# Patient Record
Sex: MALE | Race: WHITE | NOT HISPANIC OR LATINO
[De-identification: names, ages, dates, MRNs, and addresses within clinical notes are randomized per-mention and may not be internally consistent; named-entity substitution may affect disease eponyms.]

---

## 2018-03-27 ENCOUNTER — APPOINTMENT (OUTPATIENT)
Dept: FAMILY MEDICINE | Facility: CLINIC | Age: 53
End: 2018-03-27
Payer: COMMERCIAL

## 2018-03-27 VITALS
HEART RATE: 96 BPM | OXYGEN SATURATION: 78 % | BODY MASS INDEX: 30.06 KG/M2 | WEIGHT: 210 LBS | SYSTOLIC BLOOD PRESSURE: 148 MMHG | HEIGHT: 70 IN | DIASTOLIC BLOOD PRESSURE: 100 MMHG

## 2018-03-27 PROCEDURE — 93000 ELECTROCARDIOGRAM COMPLETE: CPT

## 2018-03-27 PROCEDURE — 99386 PREV VISIT NEW AGE 40-64: CPT | Mod: 25

## 2018-03-27 PROCEDURE — 36415 COLL VENOUS BLD VENIPUNCTURE: CPT

## 2018-03-28 LAB
ALBUMIN SERPL ELPH-MCNC: 4.4 G/DL
ALP BLD-CCNC: 76 U/L
ALT SERPL-CCNC: 17 U/L
ANION GAP SERPL CALC-SCNC: 16 MMOL/L
AST SERPL-CCNC: 19 U/L
BASOPHILS # BLD AUTO: 0.04 K/UL
BASOPHILS NFR BLD AUTO: 0.4 %
BILIRUB SERPL-MCNC: 0.2 MG/DL
BUN SERPL-MCNC: 23 MG/DL
CALCIUM SERPL-MCNC: 9.8 MG/DL
CHLORIDE SERPL-SCNC: 104 MMOL/L
CHOLEST SERPL-MCNC: 199 MG/DL
CHOLEST/HDLC SERPL: 5 RATIO
CO2 SERPL-SCNC: 21 MMOL/L
CREAT SERPL-MCNC: 1.16 MG/DL
EOSINOPHIL # BLD AUTO: 0.22 K/UL
EOSINOPHIL NFR BLD AUTO: 2.4 %
GLUCOSE SERPL-MCNC: 86 MG/DL
HBA1C MFR BLD HPLC: 5.8 %
HCT VFR BLD CALC: 44.9 %
HDLC SERPL-MCNC: 40 MG/DL
HGB BLD-MCNC: 15.7 G/DL
IMM GRANULOCYTES NFR BLD AUTO: 0.2 %
LDLC SERPL CALC-MCNC: 117 MG/DL
LYMPHOCYTES # BLD AUTO: 2.49 K/UL
LYMPHOCYTES NFR BLD AUTO: 27.2 %
MAN DIFF?: NORMAL
MCHC RBC-ENTMCNC: 31.9 PG
MCHC RBC-ENTMCNC: 35 GM/DL
MCV RBC AUTO: 91.3 FL
MONOCYTES # BLD AUTO: 0.93 K/UL
MONOCYTES NFR BLD AUTO: 10.2 %
NEUTROPHILS # BLD AUTO: 5.44 K/UL
NEUTROPHILS NFR BLD AUTO: 59.6 %
PLATELET # BLD AUTO: 240 K/UL
POTASSIUM SERPL-SCNC: 4.4 MMOL/L
PROT SERPL-MCNC: 7.4 G/DL
PSA SERPL-MCNC: 1.12 NG/ML
RBC # BLD: 4.92 M/UL
RBC # FLD: 14.1 %
SODIUM SERPL-SCNC: 141 MMOL/L
T4 FREE SERPL-MCNC: 1.2 NG/DL
TESTOST SERPL-MCNC: 399.1 NG/DL
TRIGL SERPL-MCNC: 210 MG/DL
TSH SERPL-ACNC: 2.2 UIU/ML
WBC # FLD AUTO: 9.14 K/UL

## 2018-04-11 ENCOUNTER — APPOINTMENT (OUTPATIENT)
Dept: FAMILY MEDICINE | Facility: CLINIC | Age: 53
End: 2018-04-11

## 2018-05-07 ENCOUNTER — APPOINTMENT (OUTPATIENT)
Dept: FAMILY MEDICINE | Facility: CLINIC | Age: 53
End: 2018-05-07
Payer: COMMERCIAL

## 2018-05-07 VITALS
WEIGHT: 210 LBS | SYSTOLIC BLOOD PRESSURE: 140 MMHG | HEART RATE: 90 BPM | OXYGEN SATURATION: 98 % | BODY MASS INDEX: 30.06 KG/M2 | DIASTOLIC BLOOD PRESSURE: 90 MMHG | HEIGHT: 70 IN

## 2018-05-07 PROCEDURE — 99214 OFFICE O/P EST MOD 30 MIN: CPT

## 2018-05-07 RX ORDER — LOSARTAN POTASSIUM 25 MG/1
25 TABLET, FILM COATED ORAL
Refills: 0 | Status: DISCONTINUED | COMMUNITY
End: 2018-05-07

## 2018-06-04 ENCOUNTER — RX RENEWAL (OUTPATIENT)
Age: 53
End: 2018-06-04

## 2018-07-09 ENCOUNTER — RX RENEWAL (OUTPATIENT)
Age: 53
End: 2018-07-09

## 2018-07-26 ENCOUNTER — APPOINTMENT (OUTPATIENT)
Dept: FAMILY MEDICINE | Facility: CLINIC | Age: 53
End: 2018-07-26
Payer: COMMERCIAL

## 2018-07-26 VITALS
TEMPERATURE: 98.1 F | BODY MASS INDEX: 29.78 KG/M2 | HEART RATE: 99 BPM | HEIGHT: 70 IN | DIASTOLIC BLOOD PRESSURE: 100 MMHG | WEIGHT: 208 LBS | OXYGEN SATURATION: 96 % | SYSTOLIC BLOOD PRESSURE: 140 MMHG

## 2018-07-26 VITALS — SYSTOLIC BLOOD PRESSURE: 140 MMHG | RESPIRATION RATE: 16 BRPM | DIASTOLIC BLOOD PRESSURE: 90 MMHG

## 2018-07-26 PROCEDURE — 99213 OFFICE O/P EST LOW 20 MIN: CPT

## 2018-07-26 NOTE — PHYSICAL EXAM
[No Acute Distress] : no acute distress [Well Nourished] : well nourished [Normal Sclera/Conjunctiva] : normal sclera/conjunctiva [EOMI] : extraocular movements intact [No Respiratory Distress] : no respiratory distress  [No Accessory Muscle Use] : no accessory muscle use [Normal Gait] : normal gait [Normal Affect] : the affect was normal [Alert and Oriented x3] : oriented to person, place, and time [Normal Insight/Judgement] : insight and judgment were intact [de-identified] : unable to bring up rt arm, reduced ROM, + rt shoulder tenderness

## 2018-07-26 NOTE — HISTORY OF PRESENT ILLNESS
[FreeTextEntry8] : c/o unable to  rt arm x 1 wk, believes he strained it at work\par on meloxicam\par hx of rt shoulder issue many yrs ago, had injection, improved\par would prefer pills over shot

## 2018-08-09 ENCOUNTER — APPOINTMENT (OUTPATIENT)
Dept: FAMILY MEDICINE | Facility: CLINIC | Age: 53
End: 2018-08-09
Payer: COMMERCIAL

## 2018-08-09 VITALS
HEART RATE: 83 BPM | WEIGHT: 206 LBS | HEIGHT: 70 IN | TEMPERATURE: 98.7 F | SYSTOLIC BLOOD PRESSURE: 140 MMHG | OXYGEN SATURATION: 96 % | BODY MASS INDEX: 29.49 KG/M2 | DIASTOLIC BLOOD PRESSURE: 100 MMHG

## 2018-08-09 DIAGNOSIS — R33.9 RETENTION OF URINE, UNSPECIFIED: ICD-10-CM

## 2018-08-09 PROCEDURE — 36415 COLL VENOUS BLD VENIPUNCTURE: CPT

## 2018-08-09 PROCEDURE — 99215 OFFICE O/P EST HI 40 MIN: CPT | Mod: 25

## 2018-08-09 RX ORDER — PREDNISONE 20 MG/1
20 TABLET ORAL
Qty: 12 | Refills: 0 | Status: DISCONTINUED | COMMUNITY
Start: 2018-07-26 | End: 2018-08-09

## 2018-08-09 RX ORDER — LOSARTAN POTASSIUM 100 MG/1
100 TABLET, FILM COATED ORAL
Qty: 90 | Refills: 1 | Status: DISCONTINUED | COMMUNITY
Start: 2018-03-27 | End: 2018-08-09

## 2018-08-09 NOTE — PHYSICAL EXAM
[No Acute Distress] : no acute distress [Well Nourished] : well nourished [Normal Sclera/Conjunctiva] : normal sclera/conjunctiva [EOMI] : extraocular movements intact [Normal Outer Ear/Nose] : the outer ears and nose were normal in appearance [No JVD] : no jugular venous distention [No Respiratory Distress] : no respiratory distress  [Clear to Auscultation] : lungs were clear to auscultation bilaterally [No Accessory Muscle Use] : no accessory muscle use [Normal Rate] : normal rate  [Regular Rhythm] : with a regular rhythm [Normal S1, S2] : normal S1 and S2 [No Edema] : there was no peripheral edema [Normal Gait] : normal gait [Normal Affect] : the affect was normal [Alert and Oriented x3] : oriented to person, place, and time [Normal Insight/Judgement] : insight and judgment were intact [de-identified] : lateral rt ankle swelling, mildly tender, increased rom to rt shoulder

## 2018-08-09 NOTE — REVIEW OF SYSTEMS
[Joint Pain] : joint pain [Negative] : Respiratory [FreeTextEntry9] : improved rt shoulder, swelling and pain to rt ankle

## 2018-08-09 NOTE — COUNSELING
[Healthy eating counseling provided] : healthy eating [Activity counseling provided] : activity [Low Salt Diet] : Low salt diet

## 2018-08-09 NOTE — HISTORY OF PRESENT ILLNESS
[FreeTextEntry1] : c/o rt ankle sprain after falling out window during work\par states rt arm/shoulder feels much better\par does occasionally c/o he feels he doesn't complete urination\par  [de-identified] : here for repeat bw and med refills, states if he stops testosterone, he feels very tired\par trying to reduce salt to control bp, occasionally feels "swollen, bloated", would consider water pills\par

## 2018-08-10 LAB
ALBUMIN SERPL ELPH-MCNC: 4.2 G/DL
ALP BLD-CCNC: 61 U/L
ALT SERPL-CCNC: 13 U/L
ANION GAP SERPL CALC-SCNC: 16 MMOL/L
AST SERPL-CCNC: 15 U/L
BASOPHILS # BLD AUTO: 0.03 K/UL
BASOPHILS NFR BLD AUTO: 0.3 %
BILIRUB SERPL-MCNC: 0.5 MG/DL
BUN SERPL-MCNC: 22 MG/DL
CALCIUM SERPL-MCNC: 9.3 MG/DL
CHLORIDE SERPL-SCNC: 104 MMOL/L
CO2 SERPL-SCNC: 22 MMOL/L
CREAT SERPL-MCNC: 1.22 MG/DL
EOSINOPHIL # BLD AUTO: 0.19 K/UL
EOSINOPHIL NFR BLD AUTO: 2.1 %
GLUCOSE SERPL-MCNC: 101 MG/DL
HBA1C MFR BLD HPLC: 5.8 %
HCT VFR BLD CALC: 48.5 %
HGB BLD-MCNC: 15.9 G/DL
IMM GRANULOCYTES NFR BLD AUTO: 0.3 %
LYMPHOCYTES # BLD AUTO: 1.81 K/UL
LYMPHOCYTES NFR BLD AUTO: 20.4 %
MAN DIFF?: NORMAL
MCHC RBC-ENTMCNC: 31.5 PG
MCHC RBC-ENTMCNC: 32.8 GM/DL
MCV RBC AUTO: 96.2 FL
MONOCYTES # BLD AUTO: 0.65 K/UL
MONOCYTES NFR BLD AUTO: 7.3 %
NEUTROPHILS # BLD AUTO: 6.15 K/UL
NEUTROPHILS NFR BLD AUTO: 69.6 %
PLATELET # BLD AUTO: 254 K/UL
POTASSIUM SERPL-SCNC: 4.6 MMOL/L
PROT SERPL-MCNC: 7.2 G/DL
RBC # BLD: 5.04 M/UL
RBC # FLD: 14.2 %
SODIUM SERPL-SCNC: 142 MMOL/L
WBC # FLD AUTO: 8.86 K/UL

## 2018-08-13 LAB
CHOLEST SERPL-MCNC: 180 MG/DL
CHOLEST/HDLC SERPL: 4.2 RATIO
HDLC SERPL-MCNC: 43 MG/DL
LDLC SERPL CALC-MCNC: 101 MG/DL
TESTOST SERPL-MCNC: 863.4 NG/DL
TRIGL SERPL-MCNC: 182 MG/DL

## 2018-09-07 ENCOUNTER — RX RENEWAL (OUTPATIENT)
Age: 53
End: 2018-09-07

## 2018-10-07 ENCOUNTER — RX RENEWAL (OUTPATIENT)
Age: 53
End: 2018-10-07

## 2018-10-31 ENCOUNTER — MEDICATION RENEWAL (OUTPATIENT)
Age: 53
End: 2018-10-31

## 2018-11-12 ENCOUNTER — APPOINTMENT (OUTPATIENT)
Dept: FAMILY MEDICINE | Facility: CLINIC | Age: 53
End: 2018-11-12
Payer: COMMERCIAL

## 2018-11-12 VITALS — SYSTOLIC BLOOD PRESSURE: 130 MMHG | DIASTOLIC BLOOD PRESSURE: 90 MMHG

## 2018-11-12 LAB
BASOPHILS # BLD AUTO: 0.05 K/UL
BASOPHILS NFR BLD AUTO: 0.7 %
EOSINOPHIL # BLD AUTO: 0.18 K/UL
EOSINOPHIL NFR BLD AUTO: 2.6 %
HCT VFR BLD CALC: 45.6 %
HGB BLD-MCNC: 15.8 G/DL
IMM GRANULOCYTES NFR BLD AUTO: 0.3 %
LYMPHOCYTES # BLD AUTO: 2.05 K/UL
LYMPHOCYTES NFR BLD AUTO: 29.8 %
MAN DIFF?: NORMAL
MCHC RBC-ENTMCNC: 31.5 PG
MCHC RBC-ENTMCNC: 34.6 GM/DL
MCV RBC AUTO: 90.8 FL
MONOCYTES # BLD AUTO: 0.6 K/UL
MONOCYTES NFR BLD AUTO: 8.7 %
NEUTROPHILS # BLD AUTO: 3.99 K/UL
NEUTROPHILS NFR BLD AUTO: 57.9 %
PLATELET # BLD AUTO: 223 K/UL
PSA SERPL-MCNC: 1.22 NG/ML
RBC # BLD: 5.02 M/UL
RBC # FLD: 13.1 %
TESTOST SERPL-MCNC: 415.1 NG/DL
WBC # FLD AUTO: 6.89 K/UL

## 2018-11-12 PROCEDURE — 90686 IIV4 VACC NO PRSV 0.5 ML IM: CPT

## 2018-11-12 PROCEDURE — G0008: CPT

## 2018-11-12 PROCEDURE — 99214 OFFICE O/P EST MOD 30 MIN: CPT | Mod: 25

## 2018-11-12 PROCEDURE — 36415 COLL VENOUS BLD VENIPUNCTURE: CPT

## 2018-11-12 NOTE — ASSESSMENT
[FreeTextEntry1] : The patient has a diagnosis of hypertension.   The diagnosis was discussed with patient and need for medication compliance and possible side affects and risks of noncompliance. Patient was told to adhere to a low salt diet and try to incorporate exercise daily.\par continue on losartan 100 and reassess\par \par Patient has a diagnosis of low testosterone documented on lab work and is symptomatic.  The diagnosis and all concerns were reviewed, addressed and discussed at length. Risks versus benefits of treatment were identified and explained. Risk of testosterone therapy include increased risk of  prostate cancer, stroke, heart attack and death. Patient was advised that he would need to monitor his blood levels regularly, attend regular doctor visits and use his medications as directed.   The medication options were discussed and application instructions were given. All questions by patient were answered at time of visit. Patients labs were draw and will be followed and assessed.\par counseled patient on need for compliance and fu\par \par knee pain - chronic oa- much better since change of job\par

## 2018-11-12 NOTE — HEALTH RISK ASSESSMENT
[] : No [No falls in past year] : Patient reported no falls in the past year [0] : 2) Feeling down, depressed, or hopeless: Not at all (0) [[id number]] : 0 [Good] : ~his/her~  mood as  good [Patient refused colonoscopy] : Patient refused colonoscopy [With Significant Other] : lives with significant other [Employed] : employed [] :  [# Of Children ___] : has [unfilled] children [Feels Safe at Home] : Feels safe at home [Fully functional (bathing, dressing, toileting, transferring, walking, feeding)] : Fully functional (bathing, dressing, toileting, transferring, walking, feeding) [Fully functional (using the telephone, shopping, preparing meals, housekeeping, doing laundry, using] : Fully functional and needs no help or supervision to perform IADLs (using the telephone, shopping, preparing meals, housekeeping, doing laundry, using transportation, managing medications and managing finances) [Discussed at today's visit] : Advance Directives Discussed at today's visit [FreeTextEntry4] : none

## 2018-11-12 NOTE — HISTORY OF PRESENT ILLNESS
[de-identified] : 52 year old male is here for a followup visit for blood pressure and testosterone. Patient also with bilateral knee pain - has history of oa of both knees. Medications and allergies were reviewed and assessed.  There has been no new medications since the last visit. Patient is feeling well with no active changes or issues since His last visit.\par

## 2018-11-12 NOTE — PHYSICAL EXAM
[Well Nourished] : well nourished [Clear to Auscultation] : lungs were clear to auscultation bilaterally [No Abdominal Bruit] : a ~M bruit was not heard ~T in the abdomen [Non Tender] : non-tender [No CVA Tenderness] : no CVA  tenderness [No Joint Swelling] : no joint swelling [No Rash] : no rash [Normal Gait] : normal gait [Normal Affect] : the affect was normal

## 2018-11-13 LAB
ALBUMIN SERPL ELPH-MCNC: 4.4 G/DL
ALP BLD-CCNC: 58 U/L
ALT SERPL-CCNC: 16 U/L
ANION GAP SERPL CALC-SCNC: 15 MMOL/L
AST SERPL-CCNC: 13 U/L
BILIRUB SERPL-MCNC: 0.4 MG/DL
BUN SERPL-MCNC: 21 MG/DL
CALCIUM SERPL-MCNC: 9.6 MG/DL
CHLORIDE SERPL-SCNC: 100 MMOL/L
CO2 SERPL-SCNC: 23 MMOL/L
CREAT SERPL-MCNC: 1.07 MG/DL
GLUCOSE SERPL-MCNC: 99 MG/DL
POTASSIUM SERPL-SCNC: 4.2 MMOL/L
PROT SERPL-MCNC: 7.1 G/DL
SODIUM SERPL-SCNC: 138 MMOL/L

## 2018-11-19 ENCOUNTER — MEDICATION RENEWAL (OUTPATIENT)
Age: 53
End: 2018-11-19

## 2019-01-03 ENCOUNTER — MEDICATION RENEWAL (OUTPATIENT)
Age: 54
End: 2019-01-03

## 2019-02-04 ENCOUNTER — MEDICATION RENEWAL (OUTPATIENT)
Age: 54
End: 2019-02-04

## 2019-02-04 ENCOUNTER — RX RENEWAL (OUTPATIENT)
Age: 54
End: 2019-02-04

## 2019-02-15 ENCOUNTER — APPOINTMENT (OUTPATIENT)
Dept: FAMILY MEDICINE | Facility: CLINIC | Age: 54
End: 2019-02-15
Payer: COMMERCIAL

## 2019-02-15 VITALS — DIASTOLIC BLOOD PRESSURE: 90 MMHG | SYSTOLIC BLOOD PRESSURE: 130 MMHG

## 2019-02-15 DIAGNOSIS — M25.569 PAIN IN UNSPECIFIED KNEE: ICD-10-CM

## 2019-02-15 PROCEDURE — 36415 COLL VENOUS BLD VENIPUNCTURE: CPT

## 2019-02-15 PROCEDURE — 99214 OFFICE O/P EST MOD 30 MIN: CPT | Mod: 25

## 2019-02-15 NOTE — HEALTH RISK ASSESSMENT
[] : No [No falls in past year] : Patient reported no falls in the past year [0] : 2) Feeling down, depressed, or hopeless: Not at all (0) [[id number]] : 0 [Good] : ~his/her~  mood as  good [Patient refused colonoscopy] : Patient refused colonoscopy [With Significant Other] : lives with significant other [Employed] : employed [] :  [# Of Children ___] : has [unfilled] children [Feels Safe at Home] : Feels safe at home [Fully functional (bathing, dressing, toileting, transferring, walking, feeding)] : Fully functional (bathing, dressing, toileting, transferring, walking, feeding) [Fully functional (using the telephone, shopping, preparing meals, housekeeping, doing laundry, using] : Fully functional and needs no help or supervision to perform IADLs (using the telephone, shopping, preparing meals, housekeeping, doing laundry, using transportation, managing medications and managing finances) [Discussed at today's visit] : Advance Directives Discussed at today's visit [FreeTextEntry4] : none

## 2019-02-15 NOTE — REVIEW OF SYSTEMS
[Impotence] : impotence [Joint Pain] : joint pain [Negative] : Heme/Lymph [FreeTextEntry9] : bilateral knee

## 2019-02-15 NOTE — HISTORY OF PRESENT ILLNESS
[de-identified] : 52 year old male is here for a followup visit for blood pressure and testosterone. Patient also with bilateral knee pain - has history of oa of both knees. Medications and allergies were reviewed and assessed.  There has been no new medications since the last visit.\par \par Patient with issues with impotence

## 2019-02-16 LAB
ALBUMIN SERPL ELPH-MCNC: 4.5 G/DL
ALP BLD-CCNC: 55 U/L
ALT SERPL-CCNC: 15 U/L
ANION GAP SERPL CALC-SCNC: 12 MMOL/L
AST SERPL-CCNC: 16 U/L
BASOPHILS # BLD AUTO: 0.03 K/UL
BASOPHILS NFR BLD AUTO: 0.3 %
BILIRUB SERPL-MCNC: 0.6 MG/DL
BUN SERPL-MCNC: 27 MG/DL
CALCIUM SERPL-MCNC: 9.4 MG/DL
CHLORIDE SERPL-SCNC: 103 MMOL/L
CHOLEST SERPL-MCNC: 175 MG/DL
CHOLEST/HDLC SERPL: 4.4 RATIO
CO2 SERPL-SCNC: 24 MMOL/L
CREAT SERPL-MCNC: 1.24 MG/DL
EOSINOPHIL # BLD AUTO: 0.18 K/UL
EOSINOPHIL NFR BLD AUTO: 2 %
GLUCOSE SERPL-MCNC: 83 MG/DL
HBA1C MFR BLD HPLC: 5.4 %
HCT VFR BLD CALC: 48.4 %
HDLC SERPL-MCNC: 40 MG/DL
HGB BLD-MCNC: 16 G/DL
IMM GRANULOCYTES NFR BLD AUTO: 0.2 %
LDLC SERPL CALC-MCNC: 86 MG/DL
LYMPHOCYTES # BLD AUTO: 1.74 K/UL
LYMPHOCYTES NFR BLD AUTO: 18.9 %
MAN DIFF?: NORMAL
MCHC RBC-ENTMCNC: 31.7 PG
MCHC RBC-ENTMCNC: 33.1 GM/DL
MCV RBC AUTO: 95.8 FL
MONOCYTES # BLD AUTO: 0.64 K/UL
MONOCYTES NFR BLD AUTO: 6.9 %
NEUTROPHILS # BLD AUTO: 6.62 K/UL
NEUTROPHILS NFR BLD AUTO: 71.7 %
PLATELET # BLD AUTO: 248 K/UL
POTASSIUM SERPL-SCNC: 4 MMOL/L
PROT SERPL-MCNC: 6.9 G/DL
RBC # BLD: 5.05 M/UL
RBC # FLD: 13.8 %
SODIUM SERPL-SCNC: 139 MMOL/L
TRIGL SERPL-MCNC: 245 MG/DL
WBC # FLD AUTO: 9.23 K/UL

## 2019-02-17 LAB — TESTOST SERPL-MCNC: 575.2 NG/DL

## 2019-03-25 ENCOUNTER — RX RENEWAL (OUTPATIENT)
Age: 54
End: 2019-03-25

## 2019-04-29 ENCOUNTER — RX RENEWAL (OUTPATIENT)
Age: 54
End: 2019-04-29

## 2019-05-20 ENCOUNTER — APPOINTMENT (OUTPATIENT)
Dept: FAMILY MEDICINE | Facility: CLINIC | Age: 54
End: 2019-05-20
Payer: COMMERCIAL

## 2019-05-20 VITALS — DIASTOLIC BLOOD PRESSURE: 80 MMHG | SYSTOLIC BLOOD PRESSURE: 112 MMHG

## 2019-05-20 PROCEDURE — 99214 OFFICE O/P EST MOD 30 MIN: CPT | Mod: 25

## 2019-05-20 PROCEDURE — 36415 COLL VENOUS BLD VENIPUNCTURE: CPT

## 2019-05-20 NOTE — HISTORY OF PRESENT ILLNESS
[de-identified] : 52 year old male is here for a followup visit for blood pressure and testosterone. Patient also with bilateral knee pain - has history of oa of both knees. Medications and allergies were reviewed and assessed.  There has been no new medications since the last visit.\par \par Patient with issues with impotence

## 2019-05-20 NOTE — ASSESSMENT
[FreeTextEntry1] : The patient has a diagnosis of hypertension.   The diagnosis was discussed with patient and need for medication compliance and possible side affects and risks of noncompliance. Patient was told to adhere to a low salt diet and try to incorporate exercise daily.\par \par Patient has a diagnosis of low testosterone documented on lab work and is symptomatic.  The diagnosis and all concerns were reviewed, addressed and discussed at length. Risks versus benefits of treatment were identified and explained. Risk of testosterone therapy include increased risk of  prostate cancer, stroke, heart attack and death. Patient was advised that he would need to monitor his blood levels regularly, attend regular doctor visits and use his medications as directed.   The medication options were discussed and application instructions were given. All questions by patient were answered at time of visit. Patients labs were draw and will be followed and assessed.\par counseled patient on need for compliance and fu\par due to low testosterone - having issues with impotence, trial of viagra\par was worked up by endocrinologist and will be reassess \par \par knee pain - chronic oa- much better since change of job\par \par lost 15 pounds, will check sugars\par

## 2019-05-20 NOTE — PHYSICAL EXAM
[Well Nourished] : well nourished [Non Tender] : non-tender [No Abdominal Bruit] : a ~M bruit was not heard ~T in the abdomen [Clear to Auscultation] : lungs were clear to auscultation bilaterally [No Joint Swelling] : no joint swelling [No CVA Tenderness] : no CVA  tenderness [Normal Gait] : normal gait [No Rash] : no rash [Normal Affect] : the affect was normal

## 2019-05-20 NOTE — REVIEW OF SYSTEMS
[Fatigue] : fatigue [Impotence] : impotence [Joint Pain] : joint pain [Negative] : Heme/Lymph [FreeTextEntry9] : bilateral knee

## 2019-05-20 NOTE — COUNSELING
[None] : None [Behavioral health counseling provided] : behavioral health  [Good understanding] : Patient has a good understanding of lifestyle changes and the steps needed to achieve self management goals

## 2019-05-20 NOTE — HEALTH RISK ASSESSMENT
[No falls in past year] : Patient reported no falls in the past year [] : No [0] : 2) Feeling down, depressed, or hopeless: Not at all (0) [[id number]] : 0 [Good] : ~his/her~  mood as  good [Patient refused colonoscopy] : Patient refused colonoscopy [With Significant Other] : lives with significant other [Employed] : employed [] :  [# Of Children ___] : has [unfilled] children [Feels Safe at Home] : Feels safe at home [Fully functional (bathing, dressing, toileting, transferring, walking, feeding)] : Fully functional (bathing, dressing, toileting, transferring, walking, feeding) [Fully functional (using the telephone, shopping, preparing meals, housekeeping, doing laundry, using] : Fully functional and needs no help or supervision to perform IADLs (using the telephone, shopping, preparing meals, housekeeping, doing laundry, using transportation, managing medications and managing finances) [Smoke Detector] : smoke detector [Seat Belt] :  uses seat belt [Discussed at today's visit] : Advance Directives Discussed at today's visit [FreeTextEntry4] : none

## 2019-05-21 ENCOUNTER — RX RENEWAL (OUTPATIENT)
Age: 54
End: 2019-05-21

## 2019-05-21 LAB
ALBUMIN SERPL ELPH-MCNC: 4.6 G/DL
ALP BLD-CCNC: 53 U/L
ALT SERPL-CCNC: 17 U/L
ANION GAP SERPL CALC-SCNC: 15 MMOL/L
AST SERPL-CCNC: 13 U/L
BASOPHILS # BLD AUTO: 0.07 K/UL
BASOPHILS NFR BLD AUTO: 0.8 %
BILIRUB SERPL-MCNC: 0.5 MG/DL
BUN SERPL-MCNC: 18 MG/DL
CALCIUM SERPL-MCNC: 9.7 MG/DL
CHLORIDE SERPL-SCNC: 104 MMOL/L
CHOLEST SERPL-MCNC: 169 MG/DL
CHOLEST/HDLC SERPL: 3.7 RATIO
CO2 SERPL-SCNC: 23 MMOL/L
CREAT SERPL-MCNC: 1.08 MG/DL
EOSINOPHIL # BLD AUTO: 0.24 K/UL
EOSINOPHIL NFR BLD AUTO: 2.6 %
ESTIMATED AVERAGE GLUCOSE: 111 MG/DL
GLUCOSE SERPL-MCNC: 112 MG/DL
HBA1C MFR BLD HPLC: 5.5 %
HBV SURFACE AB SER QL: NONREACTIVE
HCT VFR BLD CALC: 48.2 %
HDLC SERPL-MCNC: 46 MG/DL
HGB BLD-MCNC: 16.3 G/DL
IMM GRANULOCYTES NFR BLD AUTO: 0.3 %
LDLC SERPL CALC-MCNC: 96 MG/DL
LYMPHOCYTES # BLD AUTO: 1.53 K/UL
LYMPHOCYTES NFR BLD AUTO: 16.7 %
MAN DIFF?: NORMAL
MCHC RBC-ENTMCNC: 31.5 PG
MCHC RBC-ENTMCNC: 33.8 GM/DL
MCV RBC AUTO: 93.2 FL
MEV IGG FLD QL IA: 41.8 AU/ML
MEV IGG+IGM SER-IMP: POSITIVE
MONOCYTES # BLD AUTO: 0.84 K/UL
MONOCYTES NFR BLD AUTO: 9.2 %
MUV AB SER-ACNC: POSITIVE
MUV IGG SER QL IA: 12.4 AU/ML
NEUTROPHILS # BLD AUTO: 6.45 K/UL
NEUTROPHILS NFR BLD AUTO: 70.4 %
PLATELET # BLD AUTO: 241 K/UL
POTASSIUM SERPL-SCNC: 4.3 MMOL/L
PROT SERPL-MCNC: 7.2 G/DL
PSA SERPL-MCNC: 1.2 NG/ML
RBC # BLD: 5.17 M/UL
RBC # FLD: 13.2 %
RUBV IGG FLD-ACNC: 2.6 INDEX
RUBV IGG SER-IMP: POSITIVE
SODIUM SERPL-SCNC: 142 MMOL/L
TESTOST SERPL-MCNC: 634 NG/DL
TRIGL SERPL-MCNC: 134 MG/DL
VZV AB TITR SER: POSITIVE
VZV IGG SER IF-ACNC: 1324 INDEX
WBC # FLD AUTO: 9.16 K/UL

## 2019-06-21 ENCOUNTER — RX RENEWAL (OUTPATIENT)
Age: 54
End: 2019-06-21

## 2019-06-27 ENCOUNTER — MEDICATION RENEWAL (OUTPATIENT)
Age: 54
End: 2019-06-27

## 2019-06-27 ENCOUNTER — MED ADMIN CHARGE (OUTPATIENT)
Age: 54
End: 2019-06-27

## 2019-07-29 ENCOUNTER — RX RENEWAL (OUTPATIENT)
Age: 54
End: 2019-07-29

## 2019-08-02 ENCOUNTER — RX RENEWAL (OUTPATIENT)
Age: 54
End: 2019-08-02

## 2019-08-26 ENCOUNTER — APPOINTMENT (OUTPATIENT)
Dept: FAMILY MEDICINE | Facility: CLINIC | Age: 54
End: 2019-08-26
Payer: COMMERCIAL

## 2019-08-26 VITALS
SYSTOLIC BLOOD PRESSURE: 114 MMHG | WEIGHT: 197 LBS | HEART RATE: 83 BPM | DIASTOLIC BLOOD PRESSURE: 68 MMHG | BODY MASS INDEX: 28.2 KG/M2 | HEIGHT: 70 IN | RESPIRATION RATE: 16 BRPM | OXYGEN SATURATION: 96 %

## 2019-08-26 DIAGNOSIS — Z11.3 ENCOUNTER FOR SCREENING FOR INFECTIONS WITH A PREDOMINANTLY SEXUAL MODE OF TRANSMISSION: ICD-10-CM

## 2019-08-26 PROCEDURE — 36415 COLL VENOUS BLD VENIPUNCTURE: CPT

## 2019-08-26 PROCEDURE — 99214 OFFICE O/P EST MOD 30 MIN: CPT | Mod: 25

## 2019-08-26 NOTE — HISTORY OF PRESENT ILLNESS
[de-identified] : 54 year old male is here for a followup visit. Patient is here for medication renewals and for blood work discussion. Medications and allergies were reviewed and assessed.  There has been no new medications since the last visit. Patient is feeling well with no active changes or issues since His last visit.\par Patient also with bilateral knee pain - has history of oa of both knees. Medications and allergies were reviewed and assessed.  There has been no new medications since the last visit.\par also needs \par \par Patient with issues with impotence

## 2019-08-26 NOTE — HEALTH RISK ASSESSMENT
[] : No [No falls in past year] : Patient reported no falls in the past year [0] : 1) Little interest or pleasure doing things: Not at all (0) [[id number]] : 0 [Patient refused colonoscopy] : Patient refused colonoscopy [Good] : ~his/her~  mood as  good [With Significant Other] : lives with significant other [Employed] : employed [] :  [# Of Children ___] : has [unfilled] children [Fully functional (bathing, dressing, toileting, transferring, walking, feeding)] : Fully functional (bathing, dressing, toileting, transferring, walking, feeding) [Feels Safe at Home] : Feels safe at home [Fully functional (using the telephone, shopping, preparing meals, housekeeping, doing laundry, using] : Fully functional and needs no help or supervision to perform IADLs (using the telephone, shopping, preparing meals, housekeeping, doing laundry, using transportation, managing medications and managing finances) [Smoke Detector] : smoke detector [Seat Belt] :  uses seat belt [Discussed at today's visit] : Advance Directives Discussed at today's visit [FreeTextEntry4] : none

## 2019-08-26 NOTE — REVIEW OF SYSTEMS
[Fatigue] : fatigue [Impotence] : impotence [Joint Pain] : joint pain [Negative] : Heme/Lymph [FreeTextEntry9] : bilateral knee - chronic

## 2019-08-26 NOTE — ASSESSMENT
[FreeTextEntry1] : HYPERTENSION\par The patient has a diagnosis of hypertension.   The diagnosis was discussed with patient and need for medication compliance and possible side affects and risks of noncompliance. Patient was told to adhere to a low salt diet and try to incorporate exercise daily.\par \par TESTOSTERONE\par Patient has a diagnosis of low testosterone documented on lab work and is symptomatic.  The diagnosis and all concerns were reviewed, addressed and discussed at length. Risks versus benefits of treatment were identified and explained. Risk of testosterone therapy include increased risk of  prostate cancer, stroke, heart attack and death. Patient was advised that he would need to monitor his blood levels regularly, attend regular doctor visits and use his medications as directed.   The medication options were discussed and application instructions were given. All questions by patient were answered at time of visit. Patients labs were draw and will be followed and assessed.\par counseled patient on need for compliance and fu\par due to low testosterone - having issues with impotence, trial of viagra\par was worked up by endocrinologist and will be reassess \par \par knee pain - chronic oa- much better since change of job\par \par MASS OF PANCREAS\par follows with gi\par

## 2019-08-27 LAB
ALBUMIN SERPL ELPH-MCNC: 4.5 G/DL
ALP BLD-CCNC: 59 U/L
ALT SERPL-CCNC: 19 U/L
ANION GAP SERPL CALC-SCNC: 13 MMOL/L
AST SERPL-CCNC: 13 U/L
BILIRUB SERPL-MCNC: 0.3 MG/DL
BUN SERPL-MCNC: 21 MG/DL
C TRACH RRNA SPEC QL NAA+PROBE: NOT DETECTED
CALCIUM SERPL-MCNC: 9.6 MG/DL
CHLORIDE SERPL-SCNC: 104 MMOL/L
CHOLEST SERPL-MCNC: 170 MG/DL
CHOLEST/HDLC SERPL: 4.3 RATIO
CO2 SERPL-SCNC: 24 MMOL/L
CREAT SERPL-MCNC: 1.08 MG/DL
ESTIMATED AVERAGE GLUCOSE: 120 MG/DL
GLUCOSE SERPL-MCNC: 89 MG/DL
HBA1C MFR BLD HPLC: 5.8 %
HDLC SERPL-MCNC: 40 MG/DL
HIV1+2 AB SPEC QL IA.RAPID: NONREACTIVE
HSV 1+2 IGG SER IA-IMP: NEGATIVE
HSV 1+2 IGG SER IA-IMP: NEGATIVE
HSV1 IGG SER QL: <0.01 INDEX
HSV2 IGG SER QL: 0.07 INDEX
LDLC SERPL CALC-MCNC: 100 MG/DL
N GONORRHOEA RRNA SPEC QL NAA+PROBE: NOT DETECTED
POTASSIUM SERPL-SCNC: 3.8 MMOL/L
PROT SERPL-MCNC: 7.1 G/DL
SODIUM SERPL-SCNC: 141 MMOL/L
SOURCE AMPLIFICATION: NORMAL
T PALLIDUM AB SER QL IA: NEGATIVE
TESTOST SERPL-MCNC: 398 NG/DL
TRIGL SERPL-MCNC: 151 MG/DL

## 2019-08-30 LAB
HSV1 IGM SER QL: NORMAL TITER
HSV2 AB FLD-ACNC: NORMAL TITER

## 2019-09-10 ENCOUNTER — RX RENEWAL (OUTPATIENT)
Age: 54
End: 2019-09-10

## 2019-10-24 ENCOUNTER — RX RENEWAL (OUTPATIENT)
Age: 54
End: 2019-10-24

## 2019-11-19 ENCOUNTER — APPOINTMENT (OUTPATIENT)
Dept: FAMILY MEDICINE | Facility: CLINIC | Age: 54
End: 2019-11-19
Payer: COMMERCIAL

## 2019-11-19 VITALS
DIASTOLIC BLOOD PRESSURE: 78 MMHG | OXYGEN SATURATION: 98 % | HEIGHT: 70 IN | HEART RATE: 106 BPM | SYSTOLIC BLOOD PRESSURE: 108 MMHG

## 2019-11-19 PROCEDURE — 99214 OFFICE O/P EST MOD 30 MIN: CPT | Mod: 25

## 2019-11-19 PROCEDURE — 36415 COLL VENOUS BLD VENIPUNCTURE: CPT

## 2019-11-19 RX ORDER — LOSARTAN POTASSIUM AND HYDROCHLOROTHIAZIDE 25; 100 MG/1; MG/1
100-25 TABLET ORAL DAILY
Qty: 90 | Refills: 1 | Status: DISCONTINUED | COMMUNITY
Start: 2018-08-09 | End: 2019-11-19

## 2019-11-19 NOTE — ASSESSMENT
[FreeTextEntry1] : HYPERTENSION\par The patient has a diagnosis of hypertension.   The diagnosis was discussed with patient and need for medication compliance and possible side affects and risks of noncompliance. Patient was told to adhere to a low salt diet and try to incorporate exercise daily.\par switched to valsartan, doing well\par \par TESTOSTERONE\par Patient has a diagnosis of low testosterone documented on lab work and is symptomatic.  The diagnosis and all concerns were reviewed, addressed and discussed at length. Risks versus benefits of treatment were identified and explained. Risk of testosterone therapy include increased risk of  prostate cancer, stroke, heart attack and death. Patient was advised that he would need to monitor his blood levels regularly, attend regular doctor visits and use his medications as directed.   The medication options were discussed and application instructions were given. All questions by patient were answered at time of visit. Patients labs were draw and will be followed and assessed.\par counseled patient on need for compliance and fu\par due to low testosterone - having issues with impotence, trial of viagra\par was worked up by endocrinologist \par has been on testosterone since 2012\par spent time discussing options - will discuss with endo\par \par knee pain - chronic oa- much better since change of job\par \par RIGHT HIP PAIN\par MOST LIKELY BURSITIS\par Patient was advised to rest and to use moist heat throughout the day and stretch as appropriate. Patient was advised to take all medications as prescribed. Patient was advised if symptoms persist or worsen return to office.\par \par MASS OF PANCREAS\par follows with gi\par

## 2019-11-19 NOTE — HISTORY OF PRESENT ILLNESS
[de-identified] : 54 year old male is here for a followup visit. Patient is here for medication renewals and for blood work discussion. Medications and allergies were reviewed and assessed.  There has been no new medications since the last visit. Patient is feeling well with no active changes or issues since His last visit.\par \par Patient also with bilateral knee pain - has history of oa of both knees. Medications and allergies were reviewed and assessed.  There has been no new medications since the last visit.\par Patient with issues with impotence

## 2019-11-19 NOTE — HEALTH RISK ASSESSMENT
[] : No [Yes] : Yes [No falls in past year] : Patient reported no falls in the past year [0] : 2) Feeling down, depressed, or hopeless: Not at all (0) [[id number]] : 0 [Good] : ~his/her~  mood as  good [Patient refused colonoscopy] : Patient refused colonoscopy [With Significant Other] : lives with significant other [Employed] : employed [] :  [# Of Children ___] : has [unfilled] children [Feels Safe at Home] : Feels safe at home [Fully functional (bathing, dressing, toileting, transferring, walking, feeding)] : Fully functional (bathing, dressing, toileting, transferring, walking, feeding) [Smoke Detector] : smoke detector [Fully functional (using the telephone, shopping, preparing meals, housekeeping, doing laundry, using] : Fully functional and needs no help or supervision to perform IADLs (using the telephone, shopping, preparing meals, housekeeping, doing laundry, using transportation, managing medications and managing finances) [Seat Belt] :  uses seat belt [Discussed at today's visit] : Advance Directives Discussed at today's visit [FreeTextEntry4] : none

## 2019-11-19 NOTE — REVIEW OF SYSTEMS
[Impotence] : impotence [Fatigue] : fatigue [Joint Pain] : joint pain [Negative] : Heme/Lymph [FreeTextEntry9] : bilateral knee - chronic

## 2019-11-19 NOTE — PHYSICAL EXAM
[Well Nourished] : well nourished [Clear to Auscultation] : lungs were clear to auscultation bilaterally [No Abdominal Bruit] : a ~M bruit was not heard ~T in the abdomen [Non Tender] : non-tender [No CVA Tenderness] : no CVA  tenderness [No Rash] : no rash [Normal Gait] : normal gait [Normal Affect] : the affect was normal [de-identified] : tenderness to right tronchanteric bursa

## 2019-11-20 LAB
ALBUMIN SERPL ELPH-MCNC: 4.7 G/DL
ALP BLD-CCNC: 61 U/L
ALT SERPL-CCNC: 23 U/L
ANION GAP SERPL CALC-SCNC: 16 MMOL/L
AST SERPL-CCNC: 18 U/L
BILIRUB SERPL-MCNC: 0.3 MG/DL
BUN SERPL-MCNC: 27 MG/DL
CALCIUM SERPL-MCNC: 9.9 MG/DL
CHLORIDE SERPL-SCNC: 102 MMOL/L
CO2 SERPL-SCNC: 23 MMOL/L
CREAT SERPL-MCNC: 1.44 MG/DL
GLUCOSE SERPL-MCNC: 94 MG/DL
POTASSIUM SERPL-SCNC: 4.2 MMOL/L
PROT SERPL-MCNC: 7.3 G/DL
PSA SERPL-MCNC: 1.18 NG/ML
SODIUM SERPL-SCNC: 141 MMOL/L
TESTOST SERPL-MCNC: 434 NG/DL

## 2020-01-02 ENCOUNTER — RX RENEWAL (OUTPATIENT)
Age: 55
End: 2020-01-02

## 2020-02-11 ENCOUNTER — APPOINTMENT (OUTPATIENT)
Dept: ENDOCRINOLOGY | Facility: CLINIC | Age: 55
End: 2020-02-11

## 2020-02-19 ENCOUNTER — RX RENEWAL (OUTPATIENT)
Age: 55
End: 2020-02-19

## 2020-02-20 ENCOUNTER — APPOINTMENT (OUTPATIENT)
Dept: FAMILY MEDICINE | Facility: CLINIC | Age: 55
End: 2020-02-20
Payer: COMMERCIAL

## 2020-02-20 VITALS
HEIGHT: 70 IN | TEMPERATURE: 98.1 F | SYSTOLIC BLOOD PRESSURE: 120 MMHG | BODY MASS INDEX: 28.63 KG/M2 | WEIGHT: 200 LBS | OXYGEN SATURATION: 98 % | HEART RATE: 81 BPM | DIASTOLIC BLOOD PRESSURE: 81 MMHG | RESPIRATION RATE: 16 BRPM

## 2020-02-20 LAB
ALBUMIN SERPL ELPH-MCNC: 4.6 G/DL
ALP BLD-CCNC: 56 U/L
ALT SERPL-CCNC: 26 U/L
ANION GAP SERPL CALC-SCNC: 13 MMOL/L
AST SERPL-CCNC: 19 U/L
BILIRUB SERPL-MCNC: 0.4 MG/DL
BUN SERPL-MCNC: 21 MG/DL
CALCIUM SERPL-MCNC: 9.5 MG/DL
CHLORIDE SERPL-SCNC: 103 MMOL/L
CO2 SERPL-SCNC: 23 MMOL/L
CREAT SERPL-MCNC: 1.19 MG/DL
GLUCOSE SERPL-MCNC: 103 MG/DL
POTASSIUM SERPL-SCNC: 4.2 MMOL/L
PROT SERPL-MCNC: 7.2 G/DL
SODIUM SERPL-SCNC: 139 MMOL/L
TESTOST SERPL-MCNC: 182 NG/DL

## 2020-02-20 PROCEDURE — 99213 OFFICE O/P EST LOW 20 MIN: CPT

## 2020-03-04 ENCOUNTER — RX RENEWAL (OUTPATIENT)
Age: 55
End: 2020-03-04

## 2020-04-20 ENCOUNTER — RX RENEWAL (OUTPATIENT)
Age: 55
End: 2020-04-20

## 2020-05-19 ENCOUNTER — APPOINTMENT (OUTPATIENT)
Dept: ENDOCRINOLOGY | Facility: CLINIC | Age: 55
End: 2020-05-19
Payer: COMMERCIAL

## 2020-05-19 PROCEDURE — 99204 OFFICE O/P NEW MOD 45 MIN: CPT | Mod: 95

## 2020-05-19 NOTE — HISTORY OF PRESENT ILLNESS
[Medical Office: ([facility])___] : at the medical office located in  [Home] : at home, [unfilled] , at the time of the visit. [Patient] : the patient [Self] : self [FreeTextEntry1] : 54 year  Male referred for low testosterone levels. \par Mr Nany was diagnosed with hypogonadism about 5 years ago in settings of evaluation for severe fatigue and low libido. His testosterone levels were low at that time. He was seen by Dr. Glass and started on testosterone therapy with Axiron. He felt much better on it, with a great improvement in his energy levels and sexual desire.\par His erections are fine on the current therapy, spontaneous am erections are well preserved. Shaving is well preserved. Libido is fine. He's been using Viagra PRN. \par Denies visual disturbances or headaches, dizziness, nausea, vomiting, abdominal pain,  breast discharge, worsening in peripheral vision or excessive snoring. He was diagnosed with LILIANA and was using a CPAP at some point. However, he quit smoking about 4 years ago which helped a lot with his snoring and he is not using  CPA anymore. \par He underwent puberty at a normal rate compared with his peers. No history of learning disabilities or behavioral disorders. No history of insults to the brain or testes, including surgery, trauma, tumors, or radiation exposure. \par No history of diabetes, HIV, liver disease, or kidney disease. No history of medication use including anabolic steroids, glucocorticoids, chronic pain medications, or history of chemotherapy. His sense of smell is intact. \par Family history is negative for infertility problems and low testosterone.\par Currently he is in monogamous relationship with his wife, fathered 3 children.\par \par

## 2020-05-19 NOTE — ASSESSMENT
[FreeTextEntry1] : Discussed with the patient in details current approaches to hypogonadism and metabolic syndrome management. \par It is likely multifactorial in etiology , including underlying prediabetes and LILIANA. \par I've advised an evaluation for sleep apnea, and suggested an extensive nutritional education program, including weight loss teaching and evaluation. Proper dietary restrictions and exercise routines discussed.\par - R+B of testosterone replacement reviewed in details, including worsening of LILIANA and potential adverse effects on CV system, effect on hematocrit, PSA, and liver functions, as well as decrease in sperm count.\par - I reviewed with the patient topical vs injectable vs implantable testosterone preparations, as well as proper use/applications/precautions and monitoring.\par - will repeat fasting lipids, a1c, CMP, testosterone, gonadotropins, prolactin. \par - options for ED reviewed including PDE inhibitors. Patient will continue using Viagra\par - potential downtitrating of Axiron was reviewed in details; but he'll continue with 4 depressions daily pending blood work\par - I've also advised on cardiology evaluation\par RTC post labs

## 2020-05-28 ENCOUNTER — RX RENEWAL (OUTPATIENT)
Age: 55
End: 2020-05-28

## 2020-08-29 ENCOUNTER — RX RENEWAL (OUTPATIENT)
Age: 55
End: 2020-08-29

## 2020-09-04 ENCOUNTER — APPOINTMENT (OUTPATIENT)
Dept: FAMILY MEDICINE | Facility: CLINIC | Age: 55
End: 2020-09-04
Payer: COMMERCIAL

## 2020-09-04 VITALS — SYSTOLIC BLOOD PRESSURE: 128 MMHG | DIASTOLIC BLOOD PRESSURE: 80 MMHG

## 2020-09-04 DIAGNOSIS — K86.89 OTHER SPECIFIED DISEASES OF PANCREAS: ICD-10-CM

## 2020-09-04 PROCEDURE — G0008: CPT

## 2020-09-04 PROCEDURE — 36415 COLL VENOUS BLD VENIPUNCTURE: CPT

## 2020-09-04 PROCEDURE — 90686 IIV4 VACC NO PRSV 0.5 ML IM: CPT

## 2020-09-04 PROCEDURE — 99396 PREV VISIT EST AGE 40-64: CPT | Mod: 25

## 2020-09-04 NOTE — ASSESSMENT
[FreeTextEntry1] : HYPERTENSION\par The patient has a diagnosis of hypertension.   The diagnosis was discussed with patient and need for medication compliance and possible side affects and risks of noncompliance. Patient was told to adhere to a low salt diet and try to incorporate exercise daily.\par switched to valsartan, doing well\par \par TESTOSTERONE\par Patient has a diagnosis of low testosterone documented on lab work and is symptomatic.  The diagnosis and all concerns were reviewed, addressed and discussed at length. Risks versus benefits of treatment were identified and explained. Risk of testosterone therapy include increased risk of  prostate cancer, stroke, heart attack and death. Patient was advised that he would need to monitor his blood levels regularly, attend regular doctor visits and use his medications as directed.   The medication options were discussed and application instructions were given. All questions by patient were answered at time of visit. Patients labs were draw and will be followed and assessed.\par counseled patient on need for compliance and fu\par due to low testosterone - having issues with impotence, trial of viagra\par was worked up by endocrinologist \par has been on testosterone since 2012\par spent time discussing options - will discuss with endo - order labs for endo\par \par knee pain - chronic oa- much better since change of job\par \par RIGHT HIP PAIN\par MOST LIKELY BURSITIS\par Patient was advised to rest and to use moist heat throughout the day and stretch as appropriate. Patient was advised to take all medications as prescribed. Patient was advised if symptoms persist or worsen return to office.\par \par MASS OF PANCREAS\par follows with gi\par

## 2020-09-04 NOTE — HEALTH RISK ASSESSMENT
[Good] : ~his/her~  mood as  good [] : No [Yes] : Yes [No falls in past year] : Patient reported no falls in the past year [0] : 2) Feeling down, depressed, or hopeless: Not at all (0) [[id number]] : 0 [Patient reported colonoscopy was normal] : Patient reported colonoscopy was normal [Employed] : employed [With Significant Other] : lives with significant other [] :  [# Of Children ___] : has [unfilled] children [Feels Safe at Home] : Feels safe at home [Fully functional (bathing, dressing, toileting, transferring, walking, feeding)] : Fully functional (bathing, dressing, toileting, transferring, walking, feeding) [Fully functional (using the telephone, shopping, preparing meals, housekeeping, doing laundry, using] : Fully functional and needs no help or supervision to perform IADLs (using the telephone, shopping, preparing meals, housekeeping, doing laundry, using transportation, managing medications and managing finances) [Smoke Detector] : smoke detector [Seat Belt] :  uses seat belt [ColonoscopyDate] : 5/2018

## 2020-09-04 NOTE — HISTORY OF PRESENT ILLNESS
[de-identified] : 55 year old male  here for annual well visit. Patient's blood work was drawn and medications reviewed. Patient's past medical history was reviewed, allergies verified and problems were identified and assessed. Patients medications were reviewed. Patient is feeling well with no new or active complaints at this time.\par \par Patient also with bilateral knee pain - has history of oa of both knees. Medications and allergies were reviewed and assessed.  There has been no new medications since the last visit.\par Patient with issues with impotence

## 2020-09-04 NOTE — PHYSICAL EXAM
[Well Nourished] : well nourished [Clear to Auscultation] : lungs were clear to auscultation bilaterally [No Abdominal Bruit] : a ~M bruit was not heard ~T in the abdomen [Non Tender] : non-tender [No CVA Tenderness] : no CVA  tenderness [No Rash] : no rash [Normal Gait] : normal gait [Normal Affect] : the affect was normal [Normal Insight/Judgement] : insight and judgment were intact [de-identified] : tenderness to right tronchanteric bursa

## 2020-09-07 ENCOUNTER — RX RENEWAL (OUTPATIENT)
Age: 55
End: 2020-09-07

## 2020-09-07 LAB
25(OH)D3 SERPL-MCNC: 37 NG/ML
ALBUMIN SERPL ELPH-MCNC: 4.5 G/DL
ALP BLD-CCNC: 64 U/L
ALT SERPL-CCNC: 20 U/L
ANION GAP SERPL CALC-SCNC: 17 MMOL/L
AST SERPL-CCNC: 19 U/L
BASOPHILS # BLD AUTO: 0.03 K/UL
BASOPHILS NFR BLD AUTO: 0.3 %
BILIRUB SERPL-MCNC: 0.3 MG/DL
BUN SERPL-MCNC: 29 MG/DL
CALCIUM SERPL-MCNC: 9.7 MG/DL
CHLORIDE SERPL-SCNC: 103 MMOL/L
CHOLEST SERPL-MCNC: 184 MG/DL
CHOLEST/HDLC SERPL: 4.9 RATIO
CO2 SERPL-SCNC: 22 MMOL/L
CREAT SERPL-MCNC: 1.44 MG/DL
EOSINOPHIL # BLD AUTO: 0.26 K/UL
EOSINOPHIL NFR BLD AUTO: 2.6 %
ESTIMATED AVERAGE GLUCOSE: 120 MG/DL
FRUCTOSAMINE SERPL-MCNC: 223 UMOL/L
GLUCOSE SERPL-MCNC: 85 MG/DL
HBA1C MFR BLD HPLC: 5.8 %
HCT VFR BLD CALC: 50.5 %
HDLC SERPL-MCNC: 37 MG/DL
HGB BLD-MCNC: 16.2 G/DL
IMM GRANULOCYTES NFR BLD AUTO: 0.5 %
LDLC SERPL CALC-MCNC: 82 MG/DL
LYMPHOCYTES # BLD AUTO: 1.76 K/UL
LYMPHOCYTES NFR BLD AUTO: 17.5 %
MAN DIFF?: NORMAL
MCHC RBC-ENTMCNC: 31.9 PG
MCHC RBC-ENTMCNC: 32.1 GM/DL
MCV RBC AUTO: 99.4 FL
MONOCYTES # BLD AUTO: 0.64 K/UL
MONOCYTES NFR BLD AUTO: 6.4 %
NEUTROPHILS # BLD AUTO: 7.32 K/UL
NEUTROPHILS NFR BLD AUTO: 72.7 %
PLATELET # BLD AUTO: 288 K/UL
POTASSIUM SERPL-SCNC: 4.4 MMOL/L
PROLACTIN SERPL-MCNC: 12.2 NG/ML
PROT SERPL-MCNC: 7.1 G/DL
PSA SERPL-MCNC: 1.06 NG/ML
RBC # BLD: 5.08 M/UL
RBC # FLD: 13.3 %
SODIUM SERPL-SCNC: 142 MMOL/L
T4 FREE SERPL-MCNC: 1.3 NG/DL
TRIGL SERPL-MCNC: 324 MG/DL
TSH SERPL-ACNC: 1.71 UIU/ML
VIT B12 SERPL-MCNC: 594 PG/ML
WBC # FLD AUTO: 10.06 K/UL

## 2020-09-08 LAB — SHBG SERPL-SCNC: 20 NMOL/L

## 2020-09-09 LAB
TESTOST BND SERPL-MCNC: 14.7 PG/ML
TESTOST SERPL-MCNC: 554.4 NG/DL

## 2020-09-21 ENCOUNTER — APPOINTMENT (OUTPATIENT)
Dept: ENDOCRINOLOGY | Facility: CLINIC | Age: 55
End: 2020-09-21
Payer: COMMERCIAL

## 2020-09-21 VITALS
WEIGHT: 205 LBS | DIASTOLIC BLOOD PRESSURE: 80 MMHG | BODY MASS INDEX: 29.35 KG/M2 | HEIGHT: 70 IN | TEMPERATURE: 97.6 F | OXYGEN SATURATION: 98 % | HEART RATE: 75 BPM | RESPIRATION RATE: 16 BRPM | SYSTOLIC BLOOD PRESSURE: 129 MMHG

## 2020-09-21 DIAGNOSIS — R94.6 ABNORMAL RESULTS OF THYROID FUNCTION STUDIES: ICD-10-CM

## 2020-09-21 PROCEDURE — 99214 OFFICE O/P EST MOD 30 MIN: CPT

## 2020-09-21 NOTE — HISTORY OF PRESENT ILLNESS
[FreeTextEntry1] : 54 year  Male f/u for low testosterone levels. \par \par *** Sep 21, 2020 ***\par \par taking Axiron 2 depressions daily (dose reduced about 2 weeks), right after the blood work. Feels fine so far. Libido is fine. Erections are good, uses viagra very rare.\par \par HPI:\par Mr Ayon was diagnosed with hypogonadism about 5 years ago in settings of evaluation for severe fatigue and low libido. His testosterone levels were low at that time. He was seen by Dr. Glass and started on testosterone therapy with Axiron. He felt much better on it, with a great improvement in his energy levels and sexual desire.\par His erections are fine on the current therapy, spontaneous am erections are well preserved. Shaving is well preserved. Libido is fine. He's been using Viagra PRN. \par Denies visual disturbances or headaches, dizziness, nausea, vomiting, abdominal pain,  breast discharge, worsening in peripheral vision or excessive snoring. He was diagnosed with LILIANA and was using a CPAP at some point. However, he quit smoking about 4 years ago which helped a lot with his snoring and he is not using  CPA anymore. \par He underwent puberty at a normal rate compared with his peers. No history of learning disabilities or behavioral disorders. No history of insults to the brain or testes, including surgery, trauma, tumors, or radiation exposure. \par No history of diabetes, HIV, liver disease, or kidney disease. No history of medication use including anabolic steroids, glucocorticoids, chronic pain medications, or history of chemotherapy. His sense of smell is intact. \par Family history is negative for infertility problems and low testosterone.\par Currently he is in monogamous relationship with his wife, fathered 3 children.\par \par

## 2020-09-21 NOTE — ASSESSMENT
[FreeTextEntry1] : - appears clinically stable on a smaller dose of testosterone\par - potential further downtitrating of Axiron was reviewed in details; but he'll continue with 2 depressions daily \par - options for ED reviewed including PDE inhibitors. Patient will continue using Viagra PRN\par - next labs in 3 months\par - I've also advised on cardiology evaluation\par - Thyroid US\par RTC post labs

## 2020-11-19 ENCOUNTER — NON-APPOINTMENT (OUTPATIENT)
Age: 55
End: 2020-11-19

## 2020-11-20 ENCOUNTER — NON-APPOINTMENT (OUTPATIENT)
Age: 55
End: 2020-11-20

## 2020-11-20 ENCOUNTER — APPOINTMENT (OUTPATIENT)
Dept: CARDIOLOGY | Facility: CLINIC | Age: 55
End: 2020-11-20
Payer: COMMERCIAL

## 2020-11-20 VITALS
RESPIRATION RATE: 16 BRPM | SYSTOLIC BLOOD PRESSURE: 119 MMHG | DIASTOLIC BLOOD PRESSURE: 83 MMHG | WEIGHT: 205 LBS | BODY MASS INDEX: 29.35 KG/M2 | HEART RATE: 88 BPM | HEIGHT: 70 IN | TEMPERATURE: 98.3 F | OXYGEN SATURATION: 97 %

## 2020-11-20 PROCEDURE — 99204 OFFICE O/P NEW MOD 45 MIN: CPT

## 2020-11-20 PROCEDURE — 93000 ELECTROCARDIOGRAM COMPLETE: CPT

## 2020-11-20 NOTE — HISTORY OF PRESENT ILLNESS
[FreeTextEntry1] : 55M with HTN, CKD III presents to establish cardiovascular care\par Sent in by: Dr Syd Gonzalez\par PMD: Dr Ale Bond\par \par pt states he overall feels well, denies cp, or sob. denies cp or sob on exertion, states he can walk multiple blocks without difficulty. does not regularly exercise. 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 \par pt denies palpitations, dizziness, syncope, diaphoresis. pt denies LE edema, orthopnea. \par \par \par Prior cardiac workup: none\par Recent labs: 9/2020: Cr 1.44 GFR 54 tot chol 184 tg 324 hdl 37 ldl 82 a1c 5.8\par \par Med hx: HTN, CKD III, hx of ? pancreatic mass several years ago, never followed. \par Sx hx: knee sx, Left  ACL\par Fam hx: M: DM\par Social hx: lives in [facility] with wife, has 3 children (17yo twins, 21),  in [employer], prev worked in construction. former smoker, quit 10 years ago, 15 pk yr hx. social etoh, denies drug use. \par Meds: valsartan-HCTZ 320-12.5, meloxican qd, viagra \par Allergies: nkda\par

## 2020-11-20 NOTE — PHYSICAL EXAM
[General Appearance - Well Developed] : well developed [Normal Appearance] : normal appearance [General Appearance - Well Nourished] : well nourished [FreeTextEntry1] : jvd not appreciated [] : no respiratory distress [Respiration, Rhythm And Depth] : normal respiratory rhythm and effort [Auscultation Breath Sounds / Voice Sounds] : lungs were clear to auscultation bilaterally [Abdomen Soft] : soft [Abdomen Tenderness] : non-tender [Abnormal Walk] : normal gait [Skin Turgor] : normal skin turgor [Oriented To Time, Place, And Person] : oriented to person, place, and time [Impaired Insight] : insight and judgment were intact [Affect] : the affect was normal [Mood] : the mood was normal

## 2020-11-20 NOTE — REVIEW OF SYSTEMS
[Joint Pain] : joint pain [Fever] : no fever [Headache] : no headache [Recent Weight Gain (___ Lbs)] : no recent weight gain [Chills] : no chills [Feeling Fatigued] : not feeling fatigued [Recent Weight Loss (___ Lbs)] : no recent weight loss [Sore Throat] : no sore throat [Shortness Of Breath] : no shortness of breath [Dyspnea on exertion] : not dyspnea during exertion [Chest  Pressure] : no chest pressure [Chest Pain] : no chest pain [Lower Ext Edema] : no extremity edema [Leg Claudication] : no intermittent leg claudication [Palpitations] : no palpitations [Cough] : no cough [Wheezing] : no wheezing [Nausea] : no nausea [Vomiting] : no vomiting [Dizziness] : no dizziness [Confusion] : no confusion was observed [Excessive Thirst] : no polydipsia [Easy Bleeding] : no tendency for easy bleeding [Easy Bruising] : no tendency for easy bruising

## 2020-11-20 NOTE — DISCUSSION/SUMMARY
[FreeTextEntry1] : 55M with HTN, CKD III presents to establish cardiovascular care\par \par 1. HTN\par -controlled on valsartan-HCTZ 320-12.5\par -cont current regimen\par -given CKD III, would prefer tighter BP control to prevent further renal degeneration\par -will check TTE for LV function, wall thickness\par \par 2. health maintenance\par -lipids controlled, pt mentioned hx of pancreatic mass several years ago, that he never followed up on, told pt that he should follow up with his PMD/GI for interval evaluation. \par \par pending echo findings, f/u in 6 months or earlier if needed.

## 2020-12-01 ENCOUNTER — APPOINTMENT (OUTPATIENT)
Dept: CARDIOLOGY | Facility: CLINIC | Age: 55
End: 2020-12-01
Payer: COMMERCIAL

## 2020-12-01 PROCEDURE — 99072 ADDL SUPL MATRL&STAF TM PHE: CPT

## 2020-12-01 PROCEDURE — 93306 TTE W/DOPPLER COMPLETE: CPT

## 2020-12-08 ENCOUNTER — OUTPATIENT (OUTPATIENT)
Dept: OUTPATIENT SERVICES | Facility: HOSPITAL | Age: 55
LOS: 1 days | End: 2020-12-08
Payer: COMMERCIAL

## 2020-12-08 ENCOUNTER — APPOINTMENT (OUTPATIENT)
Dept: ULTRASOUND IMAGING | Facility: CLINIC | Age: 55
End: 2020-12-08
Payer: COMMERCIAL

## 2020-12-08 DIAGNOSIS — R79.89 OTHER SPECIFIED ABNORMAL FINDINGS OF BLOOD CHEMISTRY: ICD-10-CM

## 2020-12-08 PROCEDURE — 76536 US EXAM OF HEAD AND NECK: CPT | Mod: 26

## 2020-12-08 PROCEDURE — 76536 US EXAM OF HEAD AND NECK: CPT

## 2020-12-15 ENCOUNTER — APPOINTMENT (OUTPATIENT)
Dept: ENDOCRINOLOGY | Facility: CLINIC | Age: 55
End: 2020-12-15
Payer: COMMERCIAL

## 2020-12-15 VITALS
RESPIRATION RATE: 17 BRPM | DIASTOLIC BLOOD PRESSURE: 80 MMHG | HEIGHT: 70 IN | OXYGEN SATURATION: 98 % | BODY MASS INDEX: 29.35 KG/M2 | TEMPERATURE: 98.4 F | HEART RATE: 84 BPM | WEIGHT: 205 LBS | SYSTOLIC BLOOD PRESSURE: 124 MMHG

## 2020-12-15 PROCEDURE — 36415 COLL VENOUS BLD VENIPUNCTURE: CPT

## 2020-12-15 PROCEDURE — 99213 OFFICE O/P EST LOW 20 MIN: CPT | Mod: 25

## 2020-12-15 PROCEDURE — 99072 ADDL SUPL MATRL&STAF TM PHE: CPT

## 2020-12-15 NOTE — HISTORY OF PRESENT ILLNESS
[FreeTextEntry1] : 55 year  Male f/u for low testosterone levels. \par \par *** Dec 15, 2020 ***\par \par saw Dr. Henao- echo done 12/1/20- normal EF, LV concentric remodeling\par taking Axiron 2 depressions daily. Feels great on it, good energy, erections ok. using viagra PRN\par Thyr US (12/8/20)- normal\par \par *** Sep 21, 2020 ***\par \par taking Axiron 2 depressions daily (dose reduced about 2 weeks), right after the blood work. Feels fine so far. Libido is fine. Erections are good, uses viagra very rare.\par \par HPI:\par Mr Ayon was diagnosed with hypogonadism about 5 years ago in settings of evaluation for severe fatigue and low libido. His testosterone levels were low at that time. He was seen by Dr. Glass and started on testosterone therapy with Axiron. He felt much better on it, with a great improvement in his energy levels and sexual desire.\par His erections are fine on the current therapy, spontaneous am erections are well preserved. Shaving is well preserved. Libido is fine. He's been using Viagra PRN. \par Denies visual disturbances or headaches, dizziness, nausea, vomiting, abdominal pain,  breast discharge, worsening in peripheral vision or excessive snoring. He was diagnosed with LILIANA and was using a CPAP at some point. However, he quit smoking about 4 years ago which helped a lot with his snoring and he is not using  CPA anymore. \par He underwent puberty at a normal rate compared with his peers. No history of learning disabilities or behavioral disorders. No history of insults to the brain or testes, including surgery, trauma, tumors, or radiation exposure. \par No history of diabetes, HIV, liver disease, or kidney disease. No history of medication use including anabolic steroids, glucocorticoids, chronic pain medications, or history of chemotherapy. His sense of smell is intact. \par Family history is negative for infertility problems and low testosterone.\par Currently he is in monogamous relationship with his wife, fathered 3 children.\par \par

## 2020-12-15 NOTE — ASSESSMENT
[FreeTextEntry1] : - appears clinically stable on a smaller dose of testosterone\par - potential further downtitrating of Axiron was reviewed in details; but he wants to continue with 2 depressions daily \par - options for ED reviewed including PDE inhibitors. Patient will continue using Viagra PRN\par - f/u with Dr. Henao\par - weight loss reviewed\par RTC 6 mos

## 2020-12-17 LAB
ALBUMIN SERPL ELPH-MCNC: 4.4 G/DL
ALP BLD-CCNC: 59 U/L
ALT SERPL-CCNC: 32 U/L
ANION GAP SERPL CALC-SCNC: 11 MMOL/L
AST SERPL-CCNC: 20 U/L
BASOPHILS # BLD AUTO: 0.07 K/UL
BASOPHILS NFR BLD AUTO: 1 %
BILIRUB SERPL-MCNC: 0.5 MG/DL
BUN SERPL-MCNC: 19 MG/DL
CALCIUM SERPL-MCNC: 9.7 MG/DL
CHLORIDE SERPL-SCNC: 104 MMOL/L
CO2 SERPL-SCNC: 23 MMOL/L
CREAT SERPL-MCNC: 1.18 MG/DL
EOSINOPHIL # BLD AUTO: 0.12 K/UL
EOSINOPHIL NFR BLD AUTO: 1.8 %
GLUCOSE SERPL-MCNC: 94 MG/DL
HCT VFR BLD CALC: 46.8 %
HGB BLD-MCNC: 15.3 G/DL
IMM GRANULOCYTES NFR BLD AUTO: 0.3 %
LYMPHOCYTES # BLD AUTO: 1.67 K/UL
LYMPHOCYTES NFR BLD AUTO: 25 %
MAN DIFF?: NORMAL
MCHC RBC-ENTMCNC: 31 PG
MCHC RBC-ENTMCNC: 32.7 GM/DL
MCV RBC AUTO: 94.7 FL
MONOCYTES # BLD AUTO: 0.69 K/UL
MONOCYTES NFR BLD AUTO: 10.3 %
NEUTROPHILS # BLD AUTO: 4.1 K/UL
NEUTROPHILS NFR BLD AUTO: 61.6 %
PLATELET # BLD AUTO: 249 K/UL
POTASSIUM SERPL-SCNC: 4.2 MMOL/L
PROT SERPL-MCNC: 6.9 G/DL
PSA SERPL-MCNC: 1.07 NG/ML
RBC # BLD: 4.94 M/UL
RBC # FLD: 13.4 %
SHBG SERPL-SCNC: 18 NMOL/L
SODIUM SERPL-SCNC: 138 MMOL/L
TSH SERPL-ACNC: 1.96 UIU/ML
WBC # FLD AUTO: 6.67 K/UL

## 2021-01-05 LAB
TESTOST BND SERPL-MCNC: 12 PG/ML
TESTOST SERPL-MCNC: 388.3 NG/DL

## 2021-03-01 ENCOUNTER — RX RENEWAL (OUTPATIENT)
Age: 56
End: 2021-03-01

## 2021-04-12 ENCOUNTER — RX RENEWAL (OUTPATIENT)
Age: 56
End: 2021-04-12

## 2021-05-17 ENCOUNTER — RX RENEWAL (OUTPATIENT)
Age: 56
End: 2021-05-17

## 2021-06-01 ENCOUNTER — APPOINTMENT (OUTPATIENT)
Dept: ENDOCRINOLOGY | Facility: CLINIC | Age: 56
End: 2021-06-01
Payer: COMMERCIAL

## 2021-06-01 VITALS
HEIGHT: 70 IN | TEMPERATURE: 98 F | OXYGEN SATURATION: 98 % | SYSTOLIC BLOOD PRESSURE: 130 MMHG | BODY MASS INDEX: 27.92 KG/M2 | WEIGHT: 195 LBS | DIASTOLIC BLOOD PRESSURE: 84 MMHG | HEART RATE: 85 BPM

## 2021-06-01 PROCEDURE — 99072 ADDL SUPL MATRL&STAF TM PHE: CPT

## 2021-06-01 PROCEDURE — 99214 OFFICE O/P EST MOD 30 MIN: CPT

## 2021-06-01 NOTE — ASSESSMENT
[FreeTextEntry1] : - appears clinically stable on a smaller dose of testosterone\par - check trough levels this week\par - potential further downtitrating of Axiron was reviewed in details; but he wants to continue with 2 depressions daily \par - options for ED reviewed including PDE inhibitors. Patient will continue using Viagra PRN\par - f/u with Dr. Henao\par - weight loss reviewed\par RTC 6 mos

## 2021-06-01 NOTE — HISTORY OF PRESENT ILLNESS
[FreeTextEntry1] : 55 year  Male f/u for low testosterone levels. \par \par *** Jun 01, 2021 ***\par \par taking Axiron 2 depressions daily. feels fine. lost some weight. no ED, libido is fine\par no recent labs\par \par *** Dec 15, 2020 ***\par \par saw Dr. Henao- echo done 12/1/20- normal EF, LV concentric remodeling\par taking Axiron 2 depressions daily. Feels great on it, good energy, erections ok. using viagra PRN\par Thyr US (12/8/20)- normal\par \par *** Sep 21, 2020 ***\par \par taking Axiron 2 depressions daily (dose reduced about 2 weeks), right after the blood work. Feels fine so far. Libido is fine. Erections are good, uses viagra very rare.\par \par HPI:\par Mr Ayon was diagnosed with hypogonadism about 5 years ago in settings of evaluation for severe fatigue and low libido. His testosterone levels were low at that time. He was seen by Dr. Glass and started on testosterone therapy with Axiron. He felt much better on it, with a great improvement in his energy levels and sexual desire.\par His erections are fine on the current therapy, spontaneous am erections are well preserved. Shaving is well preserved. Libido is fine. He's been using Viagra PRN. \par Denies visual disturbances or headaches, dizziness, nausea, vomiting, abdominal pain,  breast discharge, worsening in peripheral vision or excessive snoring. He was diagnosed with LILIANA and was using a CPAP at some point. However, he quit smoking about 4 years ago which helped a lot with his snoring and he is not using  CPA anymore. \par He underwent puberty at a normal rate compared with his peers. No history of learning disabilities or behavioral disorders. No history of insults to the brain or testes, including surgery, trauma, tumors, or radiation exposure. \par No history of diabetes, HIV, liver disease, or kidney disease. No history of medication use including anabolic steroids, glucocorticoids, chronic pain medications, or history of chemotherapy. His sense of smell is intact. \par Family history is negative for infertility problems and low testosterone.\par Currently he is in monogamous relationship with his wife, fathered 3 children.\par \par

## 2021-06-02 ENCOUNTER — RX RENEWAL (OUTPATIENT)
Age: 56
End: 2021-06-02

## 2021-06-03 LAB
ALBUMIN SERPL ELPH-MCNC: 4.5 G/DL
ALP BLD-CCNC: 62 U/L
ALT SERPL-CCNC: 27 U/L
ANION GAP SERPL CALC-SCNC: 13 MMOL/L
AST SERPL-CCNC: 21 U/L
BASOPHILS # BLD AUTO: 0.05 K/UL
BASOPHILS NFR BLD AUTO: 0.8 %
BILIRUB SERPL-MCNC: 0.6 MG/DL
BUN SERPL-MCNC: 23 MG/DL
CALCIUM SERPL-MCNC: 9.6 MG/DL
CHLORIDE SERPL-SCNC: 105 MMOL/L
CHOLEST SERPL-MCNC: 199 MG/DL
CO2 SERPL-SCNC: 22 MMOL/L
CREAT SERPL-MCNC: 1.12 MG/DL
EOSINOPHIL # BLD AUTO: 0.13 K/UL
EOSINOPHIL NFR BLD AUTO: 2.1 %
ESTIMATED AVERAGE GLUCOSE: 114 MG/DL
GLUCOSE SERPL-MCNC: 105 MG/DL
HBA1C MFR BLD HPLC: 5.6 %
HCT VFR BLD CALC: 44.9 %
HDLC SERPL-MCNC: 40 MG/DL
HGB BLD-MCNC: 15.5 G/DL
IMM GRANULOCYTES NFR BLD AUTO: 0.5 %
LDLC SERPL CALC-MCNC: 118 MG/DL
LYMPHOCYTES # BLD AUTO: 1.4 K/UL
LYMPHOCYTES NFR BLD AUTO: 22.4 %
MAN DIFF?: NORMAL
MCHC RBC-ENTMCNC: 32.2 PG
MCHC RBC-ENTMCNC: 34.5 GM/DL
MCV RBC AUTO: 93.2 FL
MONOCYTES # BLD AUTO: 0.66 K/UL
MONOCYTES NFR BLD AUTO: 10.6 %
NEUTROPHILS # BLD AUTO: 3.98 K/UL
NEUTROPHILS NFR BLD AUTO: 63.6 %
NONHDLC SERPL-MCNC: 159 MG/DL
PLATELET # BLD AUTO: 217 K/UL
POTASSIUM SERPL-SCNC: 4.3 MMOL/L
PROT SERPL-MCNC: 6.8 G/DL
PSA SERPL-MCNC: 1 NG/ML
RBC # BLD: 4.82 M/UL
RBC # FLD: 13.4 %
SODIUM SERPL-SCNC: 141 MMOL/L
T4 FREE SERPL-MCNC: 1.3 NG/DL
TRIGL SERPL-MCNC: 204 MG/DL
TSH SERPL-ACNC: 1.55 UIU/ML
WBC # FLD AUTO: 6.25 K/UL

## 2021-06-04 LAB — SHBG SERPL-SCNC: 20.3 NMOL/L

## 2021-06-10 LAB
TESTOST BND SERPL-MCNC: 6.7 PG/ML
TESTOST SERPL-MCNC: 225.8 NG/DL

## 2021-06-20 ENCOUNTER — RX RENEWAL (OUTPATIENT)
Age: 56
End: 2021-06-20

## 2021-08-11 ENCOUNTER — APPOINTMENT (OUTPATIENT)
Dept: FAMILY MEDICINE | Facility: CLINIC | Age: 56
End: 2021-08-11
Payer: COMMERCIAL

## 2021-08-11 VITALS
HEIGHT: 70 IN | TEMPERATURE: 98.2 F | HEART RATE: 85 BPM | BODY MASS INDEX: 27.92 KG/M2 | SYSTOLIC BLOOD PRESSURE: 135 MMHG | OXYGEN SATURATION: 98 % | WEIGHT: 195 LBS | DIASTOLIC BLOOD PRESSURE: 90 MMHG

## 2021-08-11 PROCEDURE — 99396 PREV VISIT EST AGE 40-64: CPT

## 2021-08-11 RX ORDER — MELOXICAM 15 MG/1
15 TABLET ORAL
Qty: 90 | Refills: 0 | Status: DISCONTINUED | COMMUNITY
Start: 2018-05-07 | End: 2021-08-11

## 2021-08-11 RX ORDER — CYCLOBENZAPRINE HYDROCHLORIDE 10 MG/1
10 TABLET, FILM COATED ORAL
Qty: 7 | Refills: 0 | Status: DISCONTINUED | COMMUNITY
Start: 2019-11-19 | End: 2021-08-11

## 2021-08-11 NOTE — HEALTH RISK ASSESSMENT
[Good] : ~his/her~  mood as  good [] : No [Yes] : Yes [No falls in past year] : Patient reported no falls in the past year [0] : 2) Feeling down, depressed, or hopeless: Not at all (0) [[id number]] : 0 [Patient reported colonoscopy was normal] : Patient reported colonoscopy was normal [With Significant Other] : lives with significant other [Employed] : employed [] :  [# Of Children ___] : has [unfilled] children [Feels Safe at Home] : Feels safe at home [Fully functional (bathing, dressing, toileting, transferring, walking, feeding)] : Fully functional (bathing, dressing, toileting, transferring, walking, feeding) [Fully functional (using the telephone, shopping, preparing meals, housekeeping, doing laundry, using] : Fully functional and needs no help or supervision to perform IADLs (using the telephone, shopping, preparing meals, housekeeping, doing laundry, using transportation, managing medications and managing finances) [Smoke Detector] : smoke detector [Seat Belt] :  uses seat belt [ColonoscopyDate] : 5/2018 [de-identified] : [facility]

## 2021-08-11 NOTE — ASSESSMENT
[FreeTextEntry1] : HYPERTENSION\par The patient has a diagnosis of hypertension. Blood work was drawn in office and will be followed.  The diagnosis was discussed with patient and need for medication compliance and possible side affects and risks of noncompliance. Patient was told to adhere to a low salt diet and try to incorporate exercise daily.\par \par switched to valsartan, doing well\par \par ED\par patient using viagra successfully\par \par TESTOSTERONE\par Patient has a diagnosis of low testosterone documented on lab work and is symptomatic.  The diagnosis and all concerns were reviewed, addressed and discussed at length. Risks versus benefits of treatment were identified and explained. Risk of testosterone therapy include increased risk of  prostate cancer, stroke, heart attack and death. Patient was advised that he would need to monitor his blood levels regularly, attend regular doctor visits and use his medications as directed.   The medication options were discussed and application instructions were given. All questions by patient were answered at time of visit. Patients labs were draw and will be followed and assessed.\par counseled patient on need for compliance and fu\par was worked up by endocrinologist \par has been on testosterone since 2012, now seeing endo\par \par knee pain - chronic oa- much better since change of job\par \par RIGHT HIP PAIN\par MOST LIKELY BURSITIS\par Patient was advised to rest and to use moist heat throughout the day and stretch as appropriate. Patient was advised to take all medications as prescribed. Patient was advised if symptoms persist or worsen return to office.\par \par MASS OF PANCREAS\par follows with gi\par

## 2021-08-11 NOTE — HISTORY OF PRESENT ILLNESS
[de-identified] : 56 year old male  here for annual well visit. Patient's blood work was drawn and medications reviewed. Patient's past medical history was reviewed, allergies verified and problems were identified and assessed. Patients medications were reviewed. Patient is feeling well with no new or active complaints at this time.\par \par Patient also with bilateral knee pain - has history of oa of both knees. Medications and allergies were reviewed and assessed.  There has been no new medications since the last visit.\par Patient with issues with impotence

## 2021-12-07 ENCOUNTER — APPOINTMENT (OUTPATIENT)
Dept: ENDOCRINOLOGY | Facility: CLINIC | Age: 56
End: 2021-12-07
Payer: COMMERCIAL

## 2021-12-07 VITALS
BODY MASS INDEX: 27.92 KG/M2 | DIASTOLIC BLOOD PRESSURE: 70 MMHG | OXYGEN SATURATION: 98 % | TEMPERATURE: 98.5 F | HEART RATE: 94 BPM | RESPIRATION RATE: 17 BRPM | SYSTOLIC BLOOD PRESSURE: 110 MMHG | HEIGHT: 70 IN | WEIGHT: 195 LBS

## 2021-12-07 LAB
BASOPHILS # BLD AUTO: 0.05 K/UL
BASOPHILS NFR BLD AUTO: 0.7 %
EOSINOPHIL # BLD AUTO: 0.12 K/UL
EOSINOPHIL NFR BLD AUTO: 1.8 %
HCT VFR BLD CALC: 43.3 %
HGB BLD-MCNC: 14.8 G/DL
IMM GRANULOCYTES NFR BLD AUTO: 0.9 %
LYMPHOCYTES # BLD AUTO: 2.19 K/UL
LYMPHOCYTES NFR BLD AUTO: 32.4 %
MAN DIFF?: NORMAL
MCHC RBC-ENTMCNC: 31.9 PG
MCHC RBC-ENTMCNC: 34.2 GM/DL
MCV RBC AUTO: 93.3 FL
MONOCYTES # BLD AUTO: 0.66 K/UL
MONOCYTES NFR BLD AUTO: 9.8 %
NEUTROPHILS # BLD AUTO: 3.67 K/UL
NEUTROPHILS NFR BLD AUTO: 54.4 %
PLATELET # BLD AUTO: 306 K/UL
RBC # BLD: 4.64 M/UL
RBC # FLD: 13.3 %
WBC # FLD AUTO: 6.75 K/UL

## 2021-12-07 PROCEDURE — 36415 COLL VENOUS BLD VENIPUNCTURE: CPT

## 2021-12-07 PROCEDURE — 99214 OFFICE O/P EST MOD 30 MIN: CPT | Mod: 25

## 2021-12-07 NOTE — ASSESSMENT
[FreeTextEntry1] : - appears clinically stable on a smaller dose of testosterone\par - check peak levels today\par - potential further downtitrating of Axiron was reviewed in details; but he wants to continue with 2 depressions daily \par - options for ED reviewed including PDE inhibitors. Patient will continue using Viagra PRN\par - f/u with Dr. Henao as scheduled\par - weight loss reviewed\par RTC 6 mos

## 2021-12-07 NOTE — HISTORY OF PRESENT ILLNESS
[FreeTextEntry1] : 56 year  Male f/u for low testosterone levels. \par \par *** Dec 07, 2021 ***\par \par feels great, no new c/o. good erections, having normal spontaneous erections. libido is good\par taking Axiron 2 depressions daily\par labs from 6/21- testo through 225\par \par *** Jun 01, 2021 ***\par \par taking Axiron 2 depressions daily. feels fine. lost some weight. no ED, libido is fine\par no recent labs\par \par *** Dec 15, 2020 ***\par \par saw Dr. Henao- echo done 12/1/20- normal EF, LV concentric remodeling\par taking Axiron 2 depressions daily. Feels great on it, good energy, erections ok. using viagra PRN\par Thyr US (12/8/20)- normal\par \par *** Sep 21, 2020 ***\par \par taking Axiron 2 depressions daily (dose reduced about 2 weeks), right after the blood work. Feels fine so far. Libido is fine. Erections are good, uses viagra very rare.\par \par HPI:\par Mr Ayon was diagnosed with hypogonadism about 5 years ago in settings of evaluation for severe fatigue and low libido. His testosterone levels were low at that time. He was seen by Dr. Glass and started on testosterone therapy with Axiron. He felt much better on it, with a great improvement in his energy levels and sexual desire.\par His erections are fine on the current therapy, spontaneous am erections are well preserved. Shaving is well preserved. Libido is fine. He's been using Viagra PRN. \par Denies visual disturbances or headaches, dizziness, nausea, vomiting, abdominal pain,  breast discharge, worsening in peripheral vision or excessive snoring. He was diagnosed with LILIANA and was using a CPAP at some point. However, he quit smoking about 4 years ago which helped a lot with his snoring and he is not using  CPA anymore. \par He underwent puberty at a normal rate compared with his peers. No history of learning disabilities or behavioral disorders. No history of insults to the brain or testes, including surgery, trauma, tumors, or radiation exposure. \par No history of diabetes, HIV, liver disease, or kidney disease. No history of medication use including anabolic steroids, glucocorticoids, chronic pain medications, or history of chemotherapy. His sense of smell is intact. \par Family history is negative for infertility problems and low testosterone.\par Currently he is in monogamous relationship with his wife, fathered 3 children.\par \par

## 2021-12-10 LAB
25(OH)D3 SERPL-MCNC: 33 NG/ML
ALBUMIN SERPL ELPH-MCNC: 4.3 G/DL
ALP BLD-CCNC: 68 U/L
ALT SERPL-CCNC: 32 U/L
ANION GAP SERPL CALC-SCNC: 13 MMOL/L
AST SERPL-CCNC: 26 U/L
BILIRUB SERPL-MCNC: 0.6 MG/DL
BUN SERPL-MCNC: 19 MG/DL
CALCIUM SERPL-MCNC: 9.7 MG/DL
CHLORIDE SERPL-SCNC: 104 MMOL/L
CHOLEST SERPL-MCNC: 187 MG/DL
CO2 SERPL-SCNC: 22 MMOL/L
CREAT SERPL-MCNC: 1.15 MG/DL
ESTIMATED AVERAGE GLUCOSE: 120 MG/DL
GLUCOSE SERPL-MCNC: 95 MG/DL
HBA1C MFR BLD HPLC: 5.8 %
HDLC SERPL-MCNC: 35 MG/DL
LDLC SERPL CALC-MCNC: 93 MG/DL
NONHDLC SERPL-MCNC: 151 MG/DL
POTASSIUM SERPL-SCNC: 4.4 MMOL/L
PROT SERPL-MCNC: 7 G/DL
PSA SERPL-MCNC: 1.31 NG/ML
SHBG SERPL-SCNC: 21.1 NMOL/L
SODIUM SERPL-SCNC: 140 MMOL/L
TESTOST BND SERPL-MCNC: 17.6 PG/ML
TESTOSTERONE TOTAL S: 564 NG/DL
TRIGL SERPL-MCNC: 292 MG/DL

## 2022-03-10 ENCOUNTER — NON-APPOINTMENT (OUTPATIENT)
Age: 57
End: 2022-03-10

## 2022-04-04 ENCOUNTER — APPOINTMENT (OUTPATIENT)
Dept: FAMILY MEDICINE | Facility: CLINIC | Age: 57
End: 2022-04-04
Payer: COMMERCIAL

## 2022-04-04 VITALS
DIASTOLIC BLOOD PRESSURE: 112 MMHG | OXYGEN SATURATION: 92 % | BODY MASS INDEX: 28.63 KG/M2 | HEART RATE: 87 BPM | SYSTOLIC BLOOD PRESSURE: 152 MMHG | WEIGHT: 200 LBS | HEIGHT: 70 IN

## 2022-04-04 DIAGNOSIS — E34.9 ENDOCRINE DISORDER, UNSPECIFIED: ICD-10-CM

## 2022-04-04 PROCEDURE — 99214 OFFICE O/P EST MOD 30 MIN: CPT

## 2022-04-04 RX ORDER — VALSARTAN AND HYDROCHLOROTHIAZIDE 320; 12.5 MG/1; MG/1
320-12.5 TABLET, FILM COATED ORAL
Qty: 90 | Refills: 1 | Status: DISCONTINUED | COMMUNITY
Start: 2019-09-12 | End: 2022-04-04

## 2022-04-04 NOTE — HEALTH RISK ASSESSMENT
[Yes] : Yes [No falls in past year] : Patient reported no falls in the past year [0] : 2) Feeling down, depressed, or hopeless: Not at all (0) [[id number]] : 0 [Good] : ~his/her~  mood as  good [Patient reported colonoscopy was normal] : Patient reported colonoscopy was normal [With Significant Other] : lives with significant other [Employed] : employed [] :  [# Of Children ___] : has [unfilled] children [Feels Safe at Home] : Feels safe at home [Fully functional (bathing, dressing, toileting, transferring, walking, feeding)] : Fully functional (bathing, dressing, toileting, transferring, walking, feeding) [Fully functional (using the telephone, shopping, preparing meals, housekeeping, doing laundry, using] : Fully functional and needs no help or supervision to perform IADLs (using the telephone, shopping, preparing meals, housekeeping, doing laundry, using transportation, managing medications and managing finances) [Smoke Detector] : smoke detector [Seat Belt] :  uses seat belt [ColonoscopyDate] : 5/2018 [de-identified] : [facility]

## 2022-04-04 NOTE — HISTORY OF PRESENT ILLNESS
[de-identified] : 56 year old male is here for a followup visit. Patient is here for medication renewals and for blood work discussion. Medications and allergies were reviewed and assessed.  There has been no new medications since the last visit. Patient is feeling well with no active changes or issues since His last visit.\par \par Patient also with bilateral knee pain - has history of oa of both knees. Medications and allergies were reviewed and assessed.  There has been no new medications since the last visit.\par Patient with issues with impotence

## 2022-04-04 NOTE — REVIEW OF SYSTEMS
[Impotence] : impotence [Joint Pain] : joint pain [Negative] : Heme/Lymph [FreeTextEntry9] : bilateral knee - chronic

## 2022-04-04 NOTE — ASSESSMENT
[FreeTextEntry1] : HYPERTENSION\par The patient has a diagnosis of hypertension. Blood work was drawn in office and will be followed.  The diagnosis was discussed with patient and need for medication compliance and possible side affects and risks of noncompliance. Patient was told to adhere to a low salt diet and try to incorporate exercise daily.\par \par 8/1/22 switched to valsartan, doing well\par 4/4/22  patient stopped valsartan hctz due to pain, realized he was getting cramping. once he stopped he felt much better\par will try switching to amlodipine and reassess\par titrate on from 5 to 10 and reassess\par \par \par ED\par patient using viagra successfully\par \par TESTOSTERONE\par Patient has a diagnosis of low testosterone documented on lab work and is symptomatic.  The diagnosis and all concerns were reviewed, addressed and discussed at length. Risks versus benefits of treatment were identified and explained. Risk of testosterone therapy include increased risk of  prostate cancer, stroke, heart attack and death. Patient was advised that he would need to monitor his blood levels regularly, attend regular doctor visits and use his medications as directed.   The medication options were discussed and application instructions were given. All questions by patient were answered at time of visit. Patients labs were draw and will be followed and assessed.\par counseled patient on need for compliance and fu\par was worked up by endocrinologist \par has been on testosterone since 2012, now seeing endo\par \par knee pain - chronic oa- much better since change of job\par \par RIGHT HIP PAIN\par MOST LIKELY BURSITIS\par Patient was advised to rest and to use moist heat throughout the day and stretch as appropriate. Patient was advised to take all medications as prescribed. Patient was advised if symptoms persist or worsen return to office.\par \par MASS OF PANCREAS\par follows with gi\par

## 2022-04-29 ENCOUNTER — APPOINTMENT (OUTPATIENT)
Dept: FAMILY MEDICINE | Facility: CLINIC | Age: 57
End: 2022-04-29
Payer: COMMERCIAL

## 2022-04-29 VITALS
SYSTOLIC BLOOD PRESSURE: 130 MMHG | TEMPERATURE: 98 F | HEART RATE: 85 BPM | BODY MASS INDEX: 28.63 KG/M2 | DIASTOLIC BLOOD PRESSURE: 88 MMHG | HEIGHT: 70 IN | WEIGHT: 200 LBS | OXYGEN SATURATION: 97 %

## 2022-04-29 PROCEDURE — 99214 OFFICE O/P EST MOD 30 MIN: CPT

## 2022-04-29 NOTE — HISTORY OF PRESENT ILLNESS
[de-identified] : 56 year old male is here for a followup visit. Patient is here for medication renewals and for blood work discussion. Medications and allergies were reviewed and assessed.  There has been no new medications since the last visit. Patient is feeling well with no active changes or issues since His last visit.\par \par Patient also with bilateral knee pain - has history of oa of both knees. Medications and allergies were reviewed and assessed.  There has been no new medications since the last visit.\par Patient with issues with impotence verbal cues/1 person assist/verbal/tactile cues to increase step length & for proper use of RW

## 2022-04-29 NOTE — HEALTH RISK ASSESSMENT
[Yes] : Yes [No falls in past year] : Patient reported no falls in the past year [0] : 2) Feeling down, depressed, or hopeless: Not at all (0) [[id number]] : 0 [Good] : ~his/her~  mood as  good [Patient reported colonoscopy was normal] : Patient reported colonoscopy was normal [With Significant Other] : lives with significant other [Employed] : employed [] :  [# Of Children ___] : has [unfilled] children [Feels Safe at Home] : Feels safe at home [Fully functional (bathing, dressing, toileting, transferring, walking, feeding)] : Fully functional (bathing, dressing, toileting, transferring, walking, feeding) [Fully functional (using the telephone, shopping, preparing meals, housekeeping, doing laundry, using] : Fully functional and needs no help or supervision to perform IADLs (using the telephone, shopping, preparing meals, housekeeping, doing laundry, using transportation, managing medications and managing finances) [Smoke Detector] : smoke detector [Seat Belt] :  uses seat belt [ColonoscopyDate] : 5/2018 [de-identified] : [facility]

## 2022-04-29 NOTE — ASSESSMENT
[FreeTextEntry1] : HYPERTENSION\par The patient has a diagnosis of hypertension. Blood work was drawn in office and will be followed.  The diagnosis was discussed with patient and need for medication compliance and possible side affects and risks of noncompliance. Patient was told to adhere to a low salt diet and try to incorporate exercise daily.\par \par 8/1/22 switched to valsartan, doing well\par 4/4/22  patient stopped valsartan hctz due to pain, realized he was getting cramping. once he stopped he felt much better\par will try switching to amlodipine and reassess\par titrate on from 5 to 10 and reassess\par 4/29/22 now on amlodipine 10, feeling better, better controlled\par \par ED\par patient using viagra successfully\par \par TESTOSTERONE\par Patient has a diagnosis of low testosterone documented on lab work and is symptomatic.  The diagnosis and all concerns were reviewed, addressed and discussed at length. Risks versus benefits of treatment were identified and explained. Risk of testosterone therapy include increased risk of  prostate cancer, stroke, heart attack and death. Patient was advised that he would need to monitor his blood levels regularly, attend regular doctor visits and use his medications as directed.   The medication options were discussed and application instructions were given. All questions by patient were answered at time of visit. Patients labs were draw and will be followed and assessed.\par counseled patient on need for compliance and fu\par was worked up by endocrinologist \par has been on testosterone since 2012, now seeing endo\par \par knee pain - chronic oa- much better since change of job\par \par RIGHT HIP PAIN\par MOST LIKELY BURSITIS\par Patient was advised to rest and to use moist heat throughout the day and stretch as appropriate. Patient was advised to take all medications as prescribed. Patient was advised if symptoms persist or worsen return to office.\par \par MASS OF PANCREAS\par follows with gi\par

## 2022-06-07 ENCOUNTER — APPOINTMENT (OUTPATIENT)
Dept: ENDOCRINOLOGY | Facility: CLINIC | Age: 57
End: 2022-06-07
Payer: COMMERCIAL

## 2022-06-07 VITALS
DIASTOLIC BLOOD PRESSURE: 90 MMHG | OXYGEN SATURATION: 97 % | TEMPERATURE: 97.8 F | SYSTOLIC BLOOD PRESSURE: 130 MMHG | WEIGHT: 200 LBS | BODY MASS INDEX: 28.63 KG/M2 | HEART RATE: 98 BPM | HEIGHT: 70 IN

## 2022-06-07 PROCEDURE — 99214 OFFICE O/P EST MOD 30 MIN: CPT

## 2022-06-07 NOTE — ASSESSMENT
[FreeTextEntry1] : - appears clinically stable on a smaller dose of testosterone\par - check trough levels tomorrow am\par - potential further downtitrating of Axiron was reviewed in details; but he wants to continue with 2 depressions daily \par - options for ED reviewed including PDE inhibitors. Patient will continue using Viagra PRN\par - f/u with Dr. Henao as scheduled\par - weight loss reviewed in details\par RTC 6 mos

## 2022-06-07 NOTE — HISTORY OF PRESENT ILLNESS
[FreeTextEntry1] : 56 year  Male f/u for low testosterone levels. \par \par *** Jun 07, 2022 ***\par \par feels great, denies any c/o. good erections, having normal spontaneous erections. libido is good\par feels very energetic and strong. physically active. no urinary issues\par taking Axiron 2 depressions daily, norvasc 10 mg qd\par \par *** Dec 07, 2021 ***\par \par feels great, no new c/o. good erections, having normal spontaneous erections. libido is good\par taking Axiron 2 depressions daily\par labs from 6/21- testo through 225\par \par *** Jun 01, 2021 ***\par \par taking Axiron 2 depressions daily. feels fine. lost some weight. no ED, libido is fine\par no recent labs\par \par *** Dec 15, 2020 ***\par \par saw Dr. Henao- echo done 12/1/20- normal EF, LV concentric remodeling\par taking Axiron 2 depressions daily. Feels great on it, good energy, erections ok. using viagra PRN\par Thyr US (12/8/20)- normal\par \par *** Sep 21, 2020 ***\par \par taking Axiron 2 depressions daily (dose reduced about 2 weeks), right after the blood work. Feels fine so far. Libido is fine. Erections are good, uses viagra very rare.\par \par HPI:\par Mr Ayon was diagnosed with hypogonadism about 5 years ago in settings of evaluation for severe fatigue and low libido. His testosterone levels were low at that time. He was seen by Dr. Glass and started on testosterone therapy with Axiron. He felt much better on it, with a great improvement in his energy levels and sexual desire.\par His erections are fine on the current therapy, spontaneous am erections are well preserved. Shaving is well preserved. Libido is fine. He's been using Viagra PRN. \par Denies visual disturbances or headaches, dizziness, nausea, vomiting, abdominal pain,  breast discharge, worsening in peripheral vision or excessive snoring. He was diagnosed with LILIANA and was using a CPAP at some point. However, he quit smoking about 4 years ago which helped a lot with his snoring and he is not using  CPA anymore. \par He underwent puberty at a normal rate compared with his peers. No history of learning disabilities or behavioral disorders. No history of insults to the brain or testes, including surgery, trauma, tumors, or radiation exposure. \par No history of diabetes, HIV, liver disease, or kidney disease. No history of medication use including anabolic steroids, glucocorticoids, chronic pain medications, or history of chemotherapy. His sense of smell is intact. \par Family history is negative for infertility problems and low testosterone.\par Currently he is in monogamous relationship with his wife, fathered 3 children.\par \par

## 2022-07-15 ENCOUNTER — RX RENEWAL (OUTPATIENT)
Age: 57
End: 2022-07-15

## 2022-10-12 ENCOUNTER — RX RENEWAL (OUTPATIENT)
Age: 57
End: 2022-10-12

## 2022-12-06 ENCOUNTER — NON-APPOINTMENT (OUTPATIENT)
Age: 57
End: 2022-12-06

## 2022-12-07 ENCOUNTER — APPOINTMENT (OUTPATIENT)
Dept: ENDOCRINOLOGY | Facility: CLINIC | Age: 57
End: 2022-12-07

## 2022-12-07 VITALS
HEIGHT: 70 IN | OXYGEN SATURATION: 97 % | HEART RATE: 83 BPM | SYSTOLIC BLOOD PRESSURE: 150 MMHG | BODY MASS INDEX: 29.2 KG/M2 | DIASTOLIC BLOOD PRESSURE: 90 MMHG | TEMPERATURE: 98.2 F | WEIGHT: 204 LBS

## 2022-12-07 DIAGNOSIS — E78.1 PURE HYPERGLYCERIDEMIA: ICD-10-CM

## 2022-12-07 LAB
ALBUMIN SERPL ELPH-MCNC: 4.9 G/DL
ALP BLD-CCNC: 103 U/L
ALT SERPL-CCNC: 21 U/L
ANION GAP SERPL CALC-SCNC: 13 MMOL/L
AST SERPL-CCNC: 15 U/L
BASOPHILS # BLD AUTO: 0.07 K/UL
BASOPHILS NFR BLD AUTO: 0.9 %
BILIRUB SERPL-MCNC: 0.7 MG/DL
BUN SERPL-MCNC: 16 MG/DL
CALCIUM SERPL-MCNC: 9.7 MG/DL
CHLORIDE SERPL-SCNC: 102 MMOL/L
CHOLEST SERPL-MCNC: 208 MG/DL
CO2 SERPL-SCNC: 23 MMOL/L
CREAT SERPL-MCNC: 1.04 MG/DL
EGFR: 83 ML/MIN/1.73M2
EOSINOPHIL # BLD AUTO: 0.07 K/UL
EOSINOPHIL NFR BLD AUTO: 0.9 %
ESTIMATED AVERAGE GLUCOSE: 114 MG/DL
GLUCOSE SERPL-MCNC: 97 MG/DL
HBA1C MFR BLD HPLC: 5.6 %
HCT VFR BLD CALC: 50.4 %
HDLC SERPL-MCNC: 45 MG/DL
HGB BLD-MCNC: 17 G/DL
IMM GRANULOCYTES NFR BLD AUTO: 0.4 %
LDLC SERPL CALC-MCNC: 119 MG/DL
LYMPHOCYTES # BLD AUTO: 1.7 K/UL
LYMPHOCYTES NFR BLD AUTO: 21.3 %
MAN DIFF?: NORMAL
MCHC RBC-ENTMCNC: 31.4 PG
MCHC RBC-ENTMCNC: 33.7 GM/DL
MCV RBC AUTO: 93 FL
MONOCYTES # BLD AUTO: 0.7 K/UL
MONOCYTES NFR BLD AUTO: 8.8 %
NEUTROPHILS # BLD AUTO: 5.43 K/UL
NEUTROPHILS NFR BLD AUTO: 67.7 %
NONHDLC SERPL-MCNC: 163 MG/DL
PLATELET # BLD AUTO: 248 K/UL
POTASSIUM SERPL-SCNC: 4.1 MMOL/L
PROT SERPL-MCNC: 7.3 G/DL
PSA SERPL-MCNC: 1.3 NG/ML
RBC # BLD: 5.42 M/UL
RBC # FLD: 13.8 %
SODIUM SERPL-SCNC: 138 MMOL/L
T4 FREE SERPL-MCNC: 1.4 NG/DL
TRIGL SERPL-MCNC: 220 MG/DL
TSH SERPL-ACNC: 1.58 UIU/ML
WBC # FLD AUTO: 8 K/UL

## 2022-12-07 PROCEDURE — 99214 OFFICE O/P EST MOD 30 MIN: CPT | Mod: 25

## 2022-12-07 PROCEDURE — 36415 COLL VENOUS BLD VENIPUNCTURE: CPT

## 2022-12-07 RX ORDER — CLOTRIMAZOLE AND BETAMETHASONE DIPROPIONATE 10; .5 MG/G; MG/G
1-0.05 CREAM TOPICAL TWICE DAILY
Qty: 1 | Refills: 1 | Status: ACTIVE | COMMUNITY
Start: 2022-12-07 | End: 1900-01-01

## 2022-12-07 NOTE — HISTORY OF PRESENT ILLNESS
[FreeTextEntry1] : 57 year  Male f/u for low testosterone levels. \par \par *** Dec 07, 2022 ***\par \par doing well, denies any c/o. Libido, erections are fine\par good energy level, very physically active. works a lot of hours\par taking Axiron 2 depressions daily, norvasc 10 mg qd (has not been taking for some time)\par \par \par *** Jun 07, 2022 ***\par \par feels great, denies any c/o. good erections, having normal spontaneous erections. libido is good\par feels very energetic and strong. physically active. no urinary issues\par taking Axiron 2 depressions daily, norvasc 10 mg qd\par \par *** Dec 07, 2021 ***\par \par feels great, no new c/o. good erections, having normal spontaneous erections. libido is good\par taking Axiron 2 depressions daily\par labs from 6/21- testo through 225\par \par *** Jun 01, 2021 ***\par \par taking Axiron 2 depressions daily. feels fine. lost some weight. no ED, libido is fine\par no recent labs\par \par *** Dec 15, 2020 ***\par \par saw Dr. Henao- echo done 12/1/20- normal EF, LV concentric remodeling\par taking Axiron 2 depressions daily. Feels great on it, good energy, erections ok. using viagra PRN\par Thyr  (12/8/20)- normal\par \par *** Sep 21, 2020 ***\par \par taking Axiron 2 depressions daily (dose reduced about 2 weeks), right after the blood work. Feels fine so far. Libido is fine. Erections are good, uses viagra very rare.\par \par HPI:\par Mr Ayon was diagnosed with hypogonadism about 5 years ago in settings of evaluation for severe fatigue and low libido. His testosterone levels were low at that time. He was seen by Dr. Glass and started on testosterone therapy with Axiron. He felt much better on it, with a great improvement in his energy levels and sexual desire.\par His erections are fine on the current therapy, spontaneous am erections are well preserved. Shaving is well preserved. Libido is fine. He's been using Viagra PRN. \par Denies visual disturbances or headaches, dizziness, nausea, vomiting, abdominal pain,  breast discharge, worsening in peripheral vision or excessive snoring. He was diagnosed with LILIANA and was using a CPAP at some point. However, he quit smoking about 4 years ago which helped a lot with his snoring and he is not using  CPA anymore. \par He underwent puberty at a normal rate compared with his peers. No history of learning disabilities or behavioral disorders. No history of insults to the brain or testes, including surgery, trauma, tumors, or radiation exposure. \par No history of diabetes, HIV, liver disease, or kidney disease. No history of medication use including anabolic steroids, glucocorticoids, chronic pain medications, or history of chemotherapy. His sense of smell is intact. \par Family history is negative for infertility problems and low testosterone.\par Currently he is in monogamous relationship with his wife, fathered 3 children.\par \par

## 2022-12-07 NOTE — ASSESSMENT
[FreeTextEntry1] : 1. Hypogonadism\par - appears clinically stable on a smaller dose of testosterone\par - check levels today\par - potential further downtitrating of Axiron was reviewed in details; but he wants to continue with 2 depressions daily \par - options for ED reviewed including PDE inhibitors. Patient will continue using Viagra PRN\par \par 2. HTN.\par - resume daily norvasc and  f/u with Dr. Henao as scheduled\par - weight loss reviewed in details\par \par 3. Hyperlipidemia\par - dietary changes\par - consider statins if no improvement\par \par 4. ? contact dermatitis\par - change belt\par moisturizers\par - topical steroids and f/u with PCP\par \par RTC 6 mos

## 2022-12-08 LAB — SHBG SERPL-SCNC: 22.1 NMOL/L

## 2022-12-13 LAB
TESTOST FREE SERPL-MCNC: 13.4 PG/ML
TESTOST SERPL-MCNC: 358 NG/DL

## 2023-01-12 ENCOUNTER — APPOINTMENT (OUTPATIENT)
Dept: FAMILY MEDICINE | Facility: CLINIC | Age: 58
End: 2023-01-12
Payer: COMMERCIAL

## 2023-01-12 ENCOUNTER — NON-APPOINTMENT (OUTPATIENT)
Age: 58
End: 2023-01-12

## 2023-01-12 VITALS
OXYGEN SATURATION: 98 % | HEIGHT: 70 IN | TEMPERATURE: 98.2 F | BODY MASS INDEX: 29.06 KG/M2 | HEART RATE: 79 BPM | DIASTOLIC BLOOD PRESSURE: 80 MMHG | SYSTOLIC BLOOD PRESSURE: 132 MMHG | WEIGHT: 203 LBS

## 2023-01-12 DIAGNOSIS — Z87.828 PERSONAL HISTORY OF OTHER (HEALED) PHYSICAL INJURY AND TRAUMA: ICD-10-CM

## 2023-01-12 DIAGNOSIS — R16.0 HEPATOMEGALY, NOT ELSEWHERE CLASSIFIED: ICD-10-CM

## 2023-01-12 DIAGNOSIS — Z87.2 PERSONAL HISTORY OF DISEASES OF THE SKIN AND SUBCUTANEOUS TISSUE: ICD-10-CM

## 2023-01-12 DIAGNOSIS — K57.90 DIVERTICULOSIS OF INTESTINE, PART UNSPECIFIED, W/OUT PERFORATION OR ABSCESS W/OUT BLEEDING: ICD-10-CM

## 2023-01-12 DIAGNOSIS — Z83.49 FAMILY HISTORY OF OTHER ENDOCRINE, NUTRITIONAL AND METABOLIC DISEASES: ICD-10-CM

## 2023-01-12 DIAGNOSIS — M25.511 PAIN IN RIGHT SHOULDER: ICD-10-CM

## 2023-01-12 DIAGNOSIS — R94.31 ABNORMAL ELECTROCARDIOGRAM [ECG] [EKG]: ICD-10-CM

## 2023-01-12 DIAGNOSIS — Z92.29 PERSONAL HISTORY OF OTHER DRUG THERAPY: ICD-10-CM

## 2023-01-12 DIAGNOSIS — F17.210 NICOTINE DEPENDENCE, CIGARETTES, UNCOMPLICATED: ICD-10-CM

## 2023-01-12 DIAGNOSIS — Z80.8 FAMILY HISTORY OF MALIGNANT NEOPLASM OF OTHER ORGANS OR SYSTEMS: ICD-10-CM

## 2023-01-12 DIAGNOSIS — M70.71 OTHER BURSITIS OF HIP, RIGHT HIP: ICD-10-CM

## 2023-01-12 DIAGNOSIS — J30.9 ALLERGIC RHINITIS, UNSPECIFIED: ICD-10-CM

## 2023-01-12 PROCEDURE — 99396 PREV VISIT EST AGE 40-64: CPT | Mod: 25

## 2023-01-12 PROCEDURE — 93000 ELECTROCARDIOGRAM COMPLETE: CPT

## 2023-01-12 RX ORDER — FLUTICASONE PROPIONATE 50 UG/1
50 SPRAY, METERED NASAL TWICE DAILY
Qty: 1 | Refills: 1 | Status: ACTIVE | COMMUNITY
Start: 2023-01-12 | End: 1900-01-01

## 2023-01-12 NOTE — PHYSICAL EXAM
[No Acute Distress] : no acute distress [Well Nourished] : well nourished [Well Developed] : well developed [Well-Appearing] : well-appearing [Normal Sclera/Conjunctiva] : normal sclera/conjunctiva [PERRL] : pupils equal round and reactive to light [EOMI] : extraocular movements intact [Normal Outer Ear/Nose] : the outer ears and nose were normal in appearance [Normal Oropharynx] : the oropharynx was normal [Normal TMs] : both tympanic membranes were normal [No JVD] : no jugular venous distention [No Lymphadenopathy] : no lymphadenopathy [Supple] : supple [Thyroid Normal, No Nodules] : the thyroid was normal and there were no nodules present [No Respiratory Distress] : no respiratory distress  [No Accessory Muscle Use] : no accessory muscle use [Clear to Auscultation] : lungs were clear to auscultation bilaterally [Normal Rate] : normal rate  [Regular Rhythm] : with a regular rhythm [Normal S1, S2] : normal S1 and S2 [No Murmur] : no murmur heard [No Abdominal Bruit] : a ~M bruit was not heard ~T in the abdomen [No Varicosities] : no varicosities [No Edema] : there was no peripheral edema [No Palpable Aorta] : no palpable aorta [No Extremity Clubbing/Cyanosis] : no extremity clubbing/cyanosis [Soft] : abdomen soft [Non Tender] : non-tender [Non-distended] : non-distended [No Masses] : no abdominal mass palpated [Normal Bowel Sounds] : normal bowel sounds [Normal Supraclavicular Nodes] : no supraclavicular lymphadenopathy [Normal Posterior Cervical Nodes] : no posterior cervical lymphadenopathy [Normal Anterior Cervical Nodes] : no anterior cervical lymphadenopathy [No CVA Tenderness] : no CVA  tenderness [No Spinal Tenderness] : no spinal tenderness [No Joint Swelling] : no joint swelling [Grossly Normal Strength/Tone] : grossly normal strength/tone [No Rash] : no rash [Coordination Grossly Intact] : coordination grossly intact [No Focal Deficits] : no focal deficits [Normal Gait] : normal gait [Normal Affect] : the affect was normal [Alert and Oriented x3] : oriented to person, place, and time [Normal Insight/Judgement] : insight and judgment were intact [de-identified] : injected nasal turbinates [de-identified] : ?enlarged liver

## 2023-01-12 NOTE — HEALTH RISK ASSESSMENT
[Good] : ~his/her~  mood as  good [Current] : Current [20 or more] : 20 or more [Yes] : Yes [2 - 4 times a month (2 pts)] : 2-4 times a month (2 points) [3 or 4 (1 pt)] : 3 or 4  (1 point) [No] : In the past 12 months have you used drugs other than those required for medical reasons? No [0] : 2) Feeling down, depressed, or hopeless: Not at all (0) [With Family] : lives with family [# of Members in Household ___] :  household currently consist of [unfilled] member(s) [Employed] : employed [] :  [# Of Children ___] : has [unfilled] children [Feels Safe at Home] : Feels safe at home [Fully functional (bathing, dressing, toileting, transferring, walking, feeding)] : Fully functional (bathing, dressing, toileting, transferring, walking, feeding) [Fully functional (using the telephone, shopping, preparing meals, housekeeping, doing laundry, using] : Fully functional and needs no help or supervision to perform IADLs (using the telephone, shopping, preparing meals, housekeeping, doing laundry, using transportation, managing medications and managing finances) [Seat Belt] :  uses seat belt [de-identified] : no exercise [de-identified] : could do better, likes eggs, ice cream, cookies [Change in mental status noted] : No change in mental status noted [Language] : denies difficulty with language [Handling Complex Tasks] : denies difficulty handling complex tasks [Reports changes in hearing] : Reports no changes in hearing [Reports changes in vision] : Reports no changes in vision [Reports changes in dental health] : Reports no changes in dental health [ColonoscopyDate] : 05/18 [FreeTextEntry2] :  at school, abby [FreeTextEntry3] : 1 set of twins 20 yo, 25 yo

## 2023-01-13 LAB
APPEARANCE: CLEAR
BILIRUBIN URINE: NEGATIVE
BLOOD URINE: NEGATIVE
COLOR: YELLOW
GLUCOSE QUALITATIVE U: NEGATIVE
KETONES URINE: NEGATIVE
LEUKOCYTE ESTERASE URINE: NEGATIVE
NITRITE URINE: NEGATIVE
PH URINE: 6
PROTEIN URINE: NORMAL
SPECIFIC GRAVITY URINE: 1.02
UROBILINOGEN URINE: NORMAL

## 2023-02-28 ENCOUNTER — APPOINTMENT (OUTPATIENT)
Dept: INTERNAL MEDICINE | Facility: CLINIC | Age: 58
End: 2023-02-28
Payer: COMMERCIAL

## 2023-02-28 VITALS
OXYGEN SATURATION: 98 % | BODY MASS INDEX: 29.92 KG/M2 | HEIGHT: 70 IN | HEART RATE: 87 BPM | SYSTOLIC BLOOD PRESSURE: 161 MMHG | DIASTOLIC BLOOD PRESSURE: 99 MMHG | WEIGHT: 209 LBS

## 2023-02-28 PROCEDURE — 99203 OFFICE O/P NEW LOW 30 MIN: CPT

## 2023-02-28 NOTE — HISTORY OF PRESENT ILLNESS
[FreeTextEntry1] : Here to schedule a screening colonoscopy\par last colonoscopy 5 years ago and found colon polyp.\par  he is on BP meds. \par  No other problems no cardiac condition\par   at [employer]\par Quit smoking a few days ago and wearing nicotine patch. \par  testosterone defficiency.

## 2023-02-28 NOTE — PHYSICAL EXAM
[Alert] : alert [Normal Voice/Communication] : normal voice/communication [Healthy Appearing] : healthy appearing [No Acute Distress] : no acute distress [Sclera] : the sclera and conjunctiva were normal [Hearing Threshold Finger Rub Not [place]] : hearing was normal [Normal Lips/Gums] : the lips and gums were normal [Oropharynx] : the oropharynx was normal [Normal Appearance] : the appearance of the neck was normal [No Neck Mass] : no neck mass was observed [No Respiratory Distress] : no respiratory distress [No Acc Muscle Use] : no accessory muscle use [Respiration, Rhythm And Depth] : normal respiratory rhythm and effort [Auscultation Breath Sounds / Voice Sounds] : lungs were clear to auscultation bilaterally [Heart Rate And Rhythm] : heart rate was normal and rhythm regular [Normal S1, S2] : normal S1 and S2 [Murmurs] : no murmurs [Bowel Sounds] : normal bowel sounds [Abdomen Tenderness] : non-tender [No Masses] : no abdominal mass palpated [Abdomen Soft] : soft [] : no hepatosplenomegaly [Oriented To Time, Place, And Person] : oriented to person, place, and time

## 2023-03-21 DIAGNOSIS — K63.5 POLYP OF COLON: ICD-10-CM

## 2023-03-22 ENCOUNTER — APPOINTMENT (OUTPATIENT)
Dept: CARDIOLOGY | Facility: CLINIC | Age: 58
End: 2023-03-22
Payer: COMMERCIAL

## 2023-03-22 VITALS
BODY MASS INDEX: 29.35 KG/M2 | WEIGHT: 205 LBS | TEMPERATURE: 98 F | SYSTOLIC BLOOD PRESSURE: 140 MMHG | DIASTOLIC BLOOD PRESSURE: 88 MMHG | HEIGHT: 70 IN | HEART RATE: 91 BPM | OXYGEN SATURATION: 98 %

## 2023-03-22 VITALS — DIASTOLIC BLOOD PRESSURE: 86 MMHG | SYSTOLIC BLOOD PRESSURE: 144 MMHG

## 2023-03-22 DIAGNOSIS — I49.3 VENTRICULAR PREMATURE DEPOLARIZATION: ICD-10-CM

## 2023-03-22 DIAGNOSIS — I45.10 UNSPECIFIED RIGHT BUNDLE-BRANCH BLOCK: ICD-10-CM

## 2023-03-22 PROCEDURE — 99214 OFFICE O/P EST MOD 30 MIN: CPT

## 2023-03-22 NOTE — HISTORY OF PRESENT ILLNESS
[FreeTextEntry1] : 57M with HTN, borderline HLD who presents for eval of PVC's on ECG\par \par Pt feels well without complaints\par Patient denies chest pain, shortness of breath, palpitations, dizziness, lightheadedness, syncope.\par Very active job, no exertional symptoms\par Saw Dr. Henao in 2020, TTE below\par \par Light smoker.  for [employer] [facility]. Denies family hx of CAD\par \par ECG: SR with RBBB, PVC's \par TTE 2020: Normal LV function, concentric remodeling, E-A reversal\par \par Amlodipine 10mg

## 2023-03-22 NOTE — DISCUSSION/SUMMARY
[FreeTextEntry1] : HTN- blood pressure is above goal today.  Continue amlodipine 10 mg. Watch salt intake, weight loss\par \par HLD- Lipids elevated, aggressive lifestyle modifications. Trend closely, consider statin. Can consider calcium score to guide statin therapy\par \par PVC's, RBBB: Check TTE. Check EST to assess PVC response to exercise\par \par RV for cardiac testing

## 2023-04-04 ENCOUNTER — APPOINTMENT (OUTPATIENT)
Dept: INTERNAL MEDICINE | Facility: CLINIC | Age: 58
End: 2023-04-04
Payer: COMMERCIAL

## 2023-04-04 PROCEDURE — 93306 TTE W/DOPPLER COMPLETE: CPT

## 2023-05-17 ENCOUNTER — APPOINTMENT (OUTPATIENT)
Dept: CARDIOLOGY | Facility: CLINIC | Age: 58
End: 2023-05-17

## 2023-05-17 VITALS
HEIGHT: 70 IN | OXYGEN SATURATION: 96 % | BODY MASS INDEX: 29.35 KG/M2 | TEMPERATURE: 97.5 F | WEIGHT: 205 LBS | SYSTOLIC BLOOD PRESSURE: 130 MMHG | DIASTOLIC BLOOD PRESSURE: 90 MMHG | HEART RATE: 92 BPM

## 2023-05-17 NOTE — HISTORY OF PRESENT ILLNESS
[FreeTextEntry1] : 57M with HTN, borderline HLD who presents for eval of PVC's on ECG\par \par Pt feels well without complaints\par Patient denies chest pain, shortness of breath, palpitations, dizziness, lightheadedness, syncope.\par Very active job, no exertional symptoms\par Saw Dr. Henao in 2020, TTE below\par \par Light smoker.  for [employer] [facility]. Denies family hx of CAD\par \par ECG: SR with RBBB, PVC's \par TTE 2020: Normal LV function, concentric remodeling, E-A reversal\par \par Amlodipine 10mg

## 2023-06-06 ENCOUNTER — APPOINTMENT (OUTPATIENT)
Dept: ENDOCRINOLOGY | Facility: CLINIC | Age: 58
End: 2023-06-06
Payer: COMMERCIAL

## 2023-06-06 VITALS
SYSTOLIC BLOOD PRESSURE: 160 MMHG | BODY MASS INDEX: 30.02 KG/M2 | DIASTOLIC BLOOD PRESSURE: 70 MMHG | TEMPERATURE: 97.87 F | WEIGHT: 209.7 LBS | HEIGHT: 70 IN | HEART RATE: 78 BPM | RESPIRATION RATE: 16 BRPM | OXYGEN SATURATION: 99 %

## 2023-06-06 PROCEDURE — 99214 OFFICE O/P EST MOD 30 MIN: CPT | Mod: 25

## 2023-06-06 PROCEDURE — 36415 COLL VENOUS BLD VENIPUNCTURE: CPT

## 2023-06-06 NOTE — ASSESSMENT
[FreeTextEntry1] : 1. Hypogonadism\par - appears clinically stable on a smaller dose of testosterone\par - check levels today\par - potential further downtitrating of Axiron was reviewed in details; but he wants to continue with 2 depressions daily \par - options for ED reviewed including PDE inhibitors. Patient will continue using Viagra PRN\par \par 2. HTN.\par - resume daily norvasc and  f/u with cardiologist as scheduled\par - previously intolerant of Valsartan, Losartan in combo with HCTZ.\par - trial of Benicar 20 mg qd\par - weight loss is crucial\par \par 4. Obesity\par - Current approaches to weight management are discussed with the patient. \par Suggested extensive nutritional education program. Proper dietary restrictions and exercise routines discussed. Medical weight loss therapies were reviewed with the patient. Avoid Qsymia, Contrave, Phentermine d/t uncontrolled HTN\par We discussed GLP1RA. start  Wegovy 0.25 mg qw and uptitrate (R+B)\par \par \par 4. Hyperlipidemia\par - dietary changes\par - consider statins if no improvement\par \par RTC 6 mos

## 2023-06-06 NOTE — HISTORY OF PRESENT ILLNESS
[FreeTextEntry1] : 57 year  Male f/u for low testosterone levels. \par \par *** Jun 06, 2023 ***\par \par feels well, but gaining weight. exercising regularly, trying to diet w/o any success\par taking Axiron 2 depressions daily, norvasc 10 mg qd\par saw Dr. Oliveira last month- for ST  next month\par \par \par *** Dec 07, 2022 ***\par \par doing well, denies any c/o. Libido, erections are fine\par good energy level, very physically active. works a lot of hours\par taking Axiron 2 depressions daily, norvasc 10 mg qd (has not been taking for some time)\par \par \par *** Jun 07, 2022 ***\par \par feels great, denies any c/o. good erections, having normal spontaneous erections. libido is good\par feels very energetic and strong. physically active. no urinary issues\par taking Axiron 2 depressions daily, norvasc 10 mg qd\par \par *** Dec 07, 2021 ***\par \par feels great, no new c/o. good erections, having normal spontaneous erections. libido is good\par taking Axiron 2 depressions daily\par labs from 6/21- testo through 225\par \par *** Jun 01, 2021 ***\par \par taking Axiron 2 depressions daily. feels fine. lost some weight. no ED, libido is fine\par no recent labs\par \par *** Dec 15, 2020 ***\par \par saw Dr. Henao- echo done 12/1/20- normal EF, LV concentric remodeling\par taking Axiron 2 depressions daily. Feels great on it, good energy, erections ok. using viagra PRN\par Thyr US (12/8/20)- normal\par \par *** Sep 21, 2020 ***\par \par taking Axiron 2 depressions daily (dose reduced about 2 weeks), right after the blood work. Feels fine so far. Libido is fine. Erections are good, uses viagra very rare.\par \par HPI:\par Mr Ayon was diagnosed with hypogonadism about 5 years ago in settings of evaluation for severe fatigue and low libido. His testosterone levels were low at that time. He was seen by Dr. Glass and started on testosterone therapy with Axiron. He felt much better on it, with a great improvement in his energy levels and sexual desire.\par His erections are fine on the current therapy, spontaneous am erections are well preserved. Shaving is well preserved. Libido is fine. He's been using Viagra PRN. \par Denies visual disturbances or headaches, dizziness, nausea, vomiting, abdominal pain,  breast discharge, worsening in peripheral vision or excessive snoring. He was diagnosed with LILIANA and was using a CPAP at some point. However, he quit smoking about 4 years ago which helped a lot with his snoring and he is not using  CPA anymore. \par He underwent puberty at a normal rate compared with his peers. No history of learning disabilities or behavioral disorders. No history of insults to the brain or testes, including surgery, trauma, tumors, or radiation exposure. \par No history of diabetes, HIV, liver disease, or kidney disease. No history of medication use including anabolic steroids, glucocorticoids, chronic pain medications, or history of chemotherapy. His sense of smell is intact. \par Family history is negative for infertility problems and low testosterone.\par Currently he is in monogamous relationship with his wife, fathered 3 children.\par \par

## 2023-06-13 LAB
ALBUMIN SERPL ELPH-MCNC: 4.8 G/DL
ALP BLD-CCNC: 76 U/L
ALT SERPL-CCNC: 10 U/L
ANION GAP SERPL CALC-SCNC: 13 MMOL/L
AST SERPL-CCNC: 19 U/L
BILIRUB SERPL-MCNC: 0.3 MG/DL
BUN SERPL-MCNC: 13 MG/DL
CALCIUM SERPL-MCNC: 10.1 MG/DL
CHLORIDE SERPL-SCNC: 102 MMOL/L
CO2 SERPL-SCNC: 27 MMOL/L
CREAT SERPL-MCNC: 0.7 MG/DL
EGFR: 107 ML/MIN/1.73M2
ESTIMATED AVERAGE GLUCOSE: 120 MG/DL
GLUCOSE SERPL-MCNC: 69 MG/DL
HBA1C MFR BLD HPLC: 5.8 %
POTASSIUM SERPL-SCNC: 4.3 MMOL/L
PROT SERPL-MCNC: 7.3 G/DL
PSA SERPL-MCNC: <0.01 NG/ML
SHBG SERPL-SCNC: 195 NMOL/L
SODIUM SERPL-SCNC: 142 MMOL/L
T4 FREE SERPL-MCNC: 1.7 NG/DL
TESTOST FREE SERPL-MCNC: 9.2 PG/ML
TESTOST SERPL-MCNC: 4.8 NG/DL
TSH SERPL-ACNC: 0.27 UIU/ML

## 2023-07-21 ENCOUNTER — APPOINTMENT (OUTPATIENT)
Dept: INTERNAL MEDICINE | Facility: AMBULATORY MEDICAL SERVICES | Age: 58
End: 2023-07-21
Payer: COMMERCIAL

## 2023-07-21 PROCEDURE — G0121 COLON CA SCRN NOT HI RSK IND: CPT

## 2023-07-24 ENCOUNTER — APPOINTMENT (OUTPATIENT)
Dept: CARDIOLOGY | Facility: CLINIC | Age: 58
End: 2023-07-24
Payer: COMMERCIAL

## 2023-07-24 DIAGNOSIS — R94.30 ABNORMAL RESULT OF CARDIOVASCULAR FUNCTION STUDY, UNSPECIFIED: ICD-10-CM

## 2023-07-24 PROCEDURE — 93015 CV STRESS TEST SUPVJ I&R: CPT

## 2023-07-24 PROCEDURE — 99213 OFFICE O/P EST LOW 20 MIN: CPT | Mod: 25

## 2023-08-28 ENCOUNTER — RESULT REVIEW (OUTPATIENT)
Age: 58
End: 2023-08-28

## 2023-08-29 ENCOUNTER — APPOINTMENT (OUTPATIENT)
Dept: CT IMAGING | Facility: CLINIC | Age: 58
End: 2023-08-29
Payer: COMMERCIAL

## 2023-08-29 PROCEDURE — 0504T: CPT

## 2023-08-29 PROCEDURE — 75574 CT ANGIO HRT W/3D IMAGE: CPT

## 2023-08-30 ENCOUNTER — NON-APPOINTMENT (OUTPATIENT)
Age: 58
End: 2023-08-30

## 2023-09-13 ENCOUNTER — RX RENEWAL (OUTPATIENT)
Age: 58
End: 2023-09-13

## 2023-10-01 PROBLEM — K86.89 MASS OF PANCREAS: Status: RESOLVED | Noted: 2018-03-27 | Resolved: 2023-10-01

## 2023-10-10 ENCOUNTER — RX RENEWAL (OUTPATIENT)
Age: 58
End: 2023-10-10

## 2023-10-17 ENCOUNTER — APPOINTMENT (OUTPATIENT)
Dept: ENDOCRINOLOGY | Facility: CLINIC | Age: 58
End: 2023-10-17
Payer: COMMERCIAL

## 2023-10-17 VITALS
SYSTOLIC BLOOD PRESSURE: 120 MMHG | OXYGEN SATURATION: 98 % | RESPIRATION RATE: 16 BRPM | HEART RATE: 84 BPM | WEIGHT: 210.19 LBS | DIASTOLIC BLOOD PRESSURE: 82 MMHG | BODY MASS INDEX: 30.09 KG/M2 | TEMPERATURE: 98.3 F | HEIGHT: 70 IN

## 2023-10-17 PROCEDURE — 99214 OFFICE O/P EST MOD 30 MIN: CPT | Mod: 25

## 2023-10-17 PROCEDURE — 36415 COLL VENOUS BLD VENIPUNCTURE: CPT

## 2023-10-18 ENCOUNTER — TRANSCRIPTION ENCOUNTER (OUTPATIENT)
Age: 58
End: 2023-10-18

## 2023-10-20 LAB
ALBUMIN SERPL ELPH-MCNC: 4.8 G/DL
ALP BLD-CCNC: 93 U/L
ALT SERPL-CCNC: 23 U/L
ANION GAP SERPL CALC-SCNC: 14 MMOL/L
AST SERPL-CCNC: 19 U/L
BASOPHILS # BLD AUTO: 0.06 K/UL
BASOPHILS NFR BLD AUTO: 0.8 %
BILIRUB SERPL-MCNC: 0.3 MG/DL
BUN SERPL-MCNC: 24 MG/DL
CALCIUM SERPL-MCNC: 10 MG/DL
CHLORIDE SERPL-SCNC: 102 MMOL/L
CO2 SERPL-SCNC: 24 MMOL/L
CREAT SERPL-MCNC: 1.29 MG/DL
EGFR: 64 ML/MIN/1.73M2
EOSINOPHIL # BLD AUTO: 0.12 K/UL
EOSINOPHIL NFR BLD AUTO: 1.5 %
ESTIMATED AVERAGE GLUCOSE: 120 MG/DL
GLUCOSE SERPL-MCNC: 83 MG/DL
HBA1C MFR BLD HPLC: 5.8 %
HCT VFR BLD CALC: 51.9 %
HGB BLD-MCNC: 17.8 G/DL
IMM GRANULOCYTES NFR BLD AUTO: 0.4 %
LYMPHOCYTES # BLD AUTO: 2.24 K/UL
LYMPHOCYTES NFR BLD AUTO: 28 %
MAN DIFF?: NORMAL
MCHC RBC-ENTMCNC: 31.8 PG
MCHC RBC-ENTMCNC: 34.3 GM/DL
MCV RBC AUTO: 92.8 FL
MONOCYTES # BLD AUTO: 0.81 K/UL
MONOCYTES NFR BLD AUTO: 10.1 %
NEUTROPHILS # BLD AUTO: 4.73 K/UL
NEUTROPHILS NFR BLD AUTO: 59.2 %
PLATELET # BLD AUTO: 226 K/UL
POTASSIUM SERPL-SCNC: 4.3 MMOL/L
PROT SERPL-MCNC: 7.5 G/DL
PSA SERPL-MCNC: 1.14 NG/ML
RBC # BLD: 5.59 M/UL
RBC # FLD: 13.2 %
SHBG SERPL-SCNC: 16.7 NMOL/L
SODIUM SERPL-SCNC: 140 MMOL/L
T4 FREE SERPL-MCNC: 1.4 NG/DL
TESTOST FREE SERPL-MCNC: 9.8 PG/ML
TESTOST SERPL-MCNC: 330 NG/DL
TSH SERPL-ACNC: 1.91 UIU/ML
WBC # FLD AUTO: 7.99 K/UL

## 2023-10-27 ENCOUNTER — RX RENEWAL (OUTPATIENT)
Age: 58
End: 2023-10-27

## 2023-12-08 ENCOUNTER — RX RENEWAL (OUTPATIENT)
Age: 58
End: 2023-12-08

## 2024-01-03 ENCOUNTER — APPOINTMENT (OUTPATIENT)
Dept: ENDOCRINOLOGY | Facility: CLINIC | Age: 59
End: 2024-01-03
Payer: COMMERCIAL

## 2024-01-03 VITALS
WEIGHT: 210 LBS | DIASTOLIC BLOOD PRESSURE: 78 MMHG | HEART RATE: 77 BPM | OXYGEN SATURATION: 99 % | SYSTOLIC BLOOD PRESSURE: 136 MMHG | TEMPERATURE: 97.6 F | BODY MASS INDEX: 30.06 KG/M2 | HEIGHT: 70 IN | RESPIRATION RATE: 16 BRPM

## 2024-01-03 PROCEDURE — 36415 COLL VENOUS BLD VENIPUNCTURE: CPT

## 2024-01-03 PROCEDURE — 99214 OFFICE O/P EST MOD 30 MIN: CPT | Mod: 25

## 2024-01-03 RX ORDER — SILDENAFIL 50 MG/1
50 TABLET ORAL
Qty: 10 | Refills: 6 | Status: ACTIVE | COMMUNITY
Start: 2019-02-15 | End: 1900-01-01

## 2024-01-03 NOTE — ASSESSMENT
[FreeTextEntry1] : 1. Hypogonadism - appears clinically stable on a smaller dose of testosterone - check levels today - potential further downtitrating of Axiron was reviewed in details; but he wants to continue with 2 depressions daily - options for ED reviewed including PDE inhibitors. Patient will continue using Viagra PRN  2. HTN. - resume daily norvasc and f/u with cardiologist as scheduled - previously intolerant of Valsartan, Losartan in combo with HCTZ. - cont Benicar 20 mg qd - weight loss is crucial  4. Obesity - Current approaches to weight management are discussed with the patient. Suggested extensive nutritional education program. Proper dietary restrictions and exercise routines discussed. Medical weight loss therapies were reviewed with the patient. Avoid Qsymia, Contrave, Phentermine d/t uncontrolled HTN We again discussed GLP1RA. Wegovy is not available;  sucessful on Ozempic but it's denied.  will switch to Zepbound 2.5 mg qw and uptitrate (R+B)  4. Hyperlipidemia - dietary changes - advised on statins  RTC 3-4 mos.

## 2024-01-05 LAB
ALBUMIN SERPL ELPH-MCNC: 4.7 G/DL
ALP BLD-CCNC: 95 U/L
ALT SERPL-CCNC: 20 U/L
ANION GAP SERPL CALC-SCNC: 16 MMOL/L
AST SERPL-CCNC: 19 U/L
BASOPHILS # BLD AUTO: 0.07 K/UL
BASOPHILS NFR BLD AUTO: 0.8 %
BILIRUB SERPL-MCNC: 0.3 MG/DL
BUN SERPL-MCNC: 19 MG/DL
CALCIUM SERPL-MCNC: 9.7 MG/DL
CHLORIDE SERPL-SCNC: 103 MMOL/L
CO2 SERPL-SCNC: 24 MMOL/L
CREAT SERPL-MCNC: 1.1 MG/DL
EGFR: 77 ML/MIN/1.73M2
EOSINOPHIL # BLD AUTO: 0.11 K/UL
EOSINOPHIL NFR BLD AUTO: 1.2 %
ESTIMATED AVERAGE GLUCOSE: 108 MG/DL
GLUCOSE SERPL-MCNC: 79 MG/DL
HBA1C MFR BLD HPLC: 5.4 %
HCT VFR BLD CALC: 52.2 %
HGB BLD-MCNC: 17 G/DL
IMM GRANULOCYTES NFR BLD AUTO: 0.7 %
LYMPHOCYTES # BLD AUTO: 1.83 K/UL
LYMPHOCYTES NFR BLD AUTO: 20 %
MAN DIFF?: NORMAL
MCHC RBC-ENTMCNC: 31.7 PG
MCHC RBC-ENTMCNC: 32.6 GM/DL
MCV RBC AUTO: 97.2 FL
MONOCYTES # BLD AUTO: 0.85 K/UL
MONOCYTES NFR BLD AUTO: 9.3 %
NEUTROPHILS # BLD AUTO: 6.21 K/UL
NEUTROPHILS NFR BLD AUTO: 68 %
PLATELET # BLD AUTO: 232 K/UL
POTASSIUM SERPL-SCNC: 4.5 MMOL/L
PROT SERPL-MCNC: 7.5 G/DL
PSA SERPL-MCNC: 1.95 NG/ML
RBC # BLD: 5.37 M/UL
RBC # FLD: 13.8 %
SHBG SERPL-SCNC: 18.8 NMOL/L
SODIUM SERPL-SCNC: 142 MMOL/L
TESTOST FREE SERPL-MCNC: 8.3 PG/ML
TESTOST SERPL-MCNC: 280 NG/DL
TSH SERPL-ACNC: 1.88 UIU/ML
WBC # FLD AUTO: 9.13 K/UL

## 2024-01-08 RX ORDER — TIRZEPATIDE 2.5 MG/.5ML
2.5 INJECTION, SOLUTION SUBCUTANEOUS
Qty: 4 | Refills: 4 | Status: ACTIVE | COMMUNITY
Start: 2024-01-03 | End: 1900-01-01

## 2024-01-10 ENCOUNTER — RX RENEWAL (OUTPATIENT)
Age: 59
End: 2024-01-10

## 2024-01-22 ENCOUNTER — APPOINTMENT (OUTPATIENT)
Dept: FAMILY MEDICINE | Facility: CLINIC | Age: 59
End: 2024-01-22
Payer: COMMERCIAL

## 2024-01-22 VITALS
TEMPERATURE: 98.7 F | OXYGEN SATURATION: 98 % | SYSTOLIC BLOOD PRESSURE: 137 MMHG | DIASTOLIC BLOOD PRESSURE: 95 MMHG | HEIGHT: 70 IN | RESPIRATION RATE: 16 BRPM | WEIGHT: 205 LBS | BODY MASS INDEX: 29.35 KG/M2 | HEART RATE: 87 BPM

## 2024-01-22 DIAGNOSIS — Z00.00 ENCOUNTER FOR GENERAL ADULT MEDICAL EXAMINATION W/OUT ABNORMAL FINDINGS: ICD-10-CM

## 2024-01-22 DIAGNOSIS — Z23 ENCOUNTER FOR IMMUNIZATION: ICD-10-CM

## 2024-01-22 PROCEDURE — 90686 IIV4 VACC NO PRSV 0.5 ML IM: CPT

## 2024-01-22 PROCEDURE — 99396 PREV VISIT EST AGE 40-64: CPT | Mod: 25

## 2024-01-22 PROCEDURE — G0008: CPT

## 2024-01-22 RX ORDER — SEMAGLUTIDE 0.68 MG/ML
2 INJECTION, SOLUTION SUBCUTANEOUS
Qty: 1 | Refills: 5 | Status: DISCONTINUED | COMMUNITY
Start: 2023-10-17 | End: 2024-01-22

## 2024-01-22 RX ORDER — SEMAGLUTIDE 0.25 MG/.5ML
0.25 INJECTION, SOLUTION SUBCUTANEOUS
Qty: 1 | Refills: 4 | Status: DISCONTINUED | COMMUNITY
Start: 2023-06-06 | End: 2024-01-22

## 2024-01-22 RX ORDER — BLOOD-GLUCOSE METER
KIT MISCELLANEOUS
Qty: 1 | Refills: 0 | Status: ACTIVE | COMMUNITY
Start: 2024-01-22 | End: 1900-01-01

## 2024-01-22 NOTE — HISTORY OF PRESENT ILLNESS
[FreeTextEntry1] : CPE  [de-identified] : 59 yo M PMHx HTN, presenting for CPE. feels well.  active smoker. doing nicotine patch down to 5 cigarettes/day.  sometimes can't sleep when he smokes he saw endo for low testosterone and weight management. he is waiting for new script for zepbound.

## 2024-01-22 NOTE — PLAN
[FreeTextEntry1] : 1. CPE  - routine blood work  - stress testing/EKG done in july normal, no anginal symptoms  - shingrix utd  - flu given today  2. htn  - BP mildly elevated pt did not take second antihypertensive today  - check BP at home  - continue smoking cessation  3. obesity - continue excercise/diet  - awaiting zepbound prescription sees endo

## 2024-01-22 NOTE — HEALTH RISK ASSESSMENT
[Good] : ~his/her~  mood as  good [Yes] : Yes [2 - 4 times a month (2 pts)] : 2-4 times a month (2 points) [0] : 2) Feeling down, depressed, or hopeless: Not at all (0) [PHQ-2 Negative - No further assessment needed] : PHQ-2 Negative - No further assessment needed [de-identified] : weight training cardio 3-4x week  [[id number]] : 0 [Patient reported colonoscopy was normal] : Patient reported colonoscopy was normal [# of Members in Household ___] :  household currently consist of [unfilled] member(s) [Employed] : employed [Feels Safe at Home] : Feels safe at home [Reports changes in hearing] : Reports no changes in hearing [Reports changes in vision] : Reports no changes in vision [ColonoscopyDate] : 07/23 [FreeTextEntry2] :  ernaldo quiroz  [Current] : Current [10-14] : 10-14

## 2024-01-24 LAB
ALBUMIN SERPL ELPH-MCNC: 4.7 G/DL
ALP BLD-CCNC: 86 U/L
ALT SERPL-CCNC: 19 U/L
ANION GAP SERPL CALC-SCNC: 20 MMOL/L
AST SERPL-CCNC: 18 U/L
BASOPHILS # BLD AUTO: 0.08 K/UL
BASOPHILS NFR BLD AUTO: 1.1 %
BILIRUB SERPL-MCNC: 0.2 MG/DL
BUN SERPL-MCNC: 16 MG/DL
CALCIUM SERPL-MCNC: 9.4 MG/DL
CHLORIDE SERPL-SCNC: 105 MMOL/L
CHOLEST SERPL-MCNC: 188 MG/DL
CO2 SERPL-SCNC: 19 MMOL/L
CREAT SERPL-MCNC: 1.08 MG/DL
EGFR: 79 ML/MIN/1.73M2
EOSINOPHIL # BLD AUTO: 0.1 K/UL
EOSINOPHIL NFR BLD AUTO: 1.3 %
GLUCOSE SERPL-MCNC: 87 MG/DL
HCT VFR BLD CALC: 49.9 %
HDLC SERPL-MCNC: 43 MG/DL
HGB BLD-MCNC: 16.8 G/DL
IMM GRANULOCYTES NFR BLD AUTO: 0.3 %
LDLC SERPL CALC-MCNC: 112 MG/DL
LYMPHOCYTES # BLD AUTO: 1.99 K/UL
LYMPHOCYTES NFR BLD AUTO: 26.6 %
MAN DIFF?: NORMAL
MCHC RBC-ENTMCNC: 32.6 PG
MCHC RBC-ENTMCNC: 33.7 GM/DL
MCV RBC AUTO: 96.7 FL
MONOCYTES # BLD AUTO: 0.75 K/UL
MONOCYTES NFR BLD AUTO: 10 %
NEUTROPHILS # BLD AUTO: 4.53 K/UL
NEUTROPHILS NFR BLD AUTO: 60.7 %
NONHDLC SERPL-MCNC: 144 MG/DL
PLATELET # BLD AUTO: 215 K/UL
POTASSIUM SERPL-SCNC: 4.2 MMOL/L
PROT SERPL-MCNC: 7.4 G/DL
RBC # BLD: 5.16 M/UL
RBC # FLD: 13.3 %
SODIUM SERPL-SCNC: 144 MMOL/L
TRIGL SERPL-MCNC: 183 MG/DL
WBC # FLD AUTO: 7.47 K/UL

## 2024-02-15 ENCOUNTER — RX RENEWAL (OUTPATIENT)
Age: 59
End: 2024-02-15

## 2024-03-14 ENCOUNTER — RX RENEWAL (OUTPATIENT)
Age: 59
End: 2024-03-14

## 2024-03-27 ENCOUNTER — RX RENEWAL (OUTPATIENT)
Age: 59
End: 2024-03-27

## 2024-03-27 RX ORDER — OLMESARTAN MEDOXOMIL 20 MG/1
20 TABLET, FILM COATED ORAL DAILY
Qty: 30 | Refills: 6 | Status: ACTIVE | COMMUNITY
Start: 2023-06-06 | End: 1900-01-01

## 2024-04-10 ENCOUNTER — APPOINTMENT (OUTPATIENT)
Dept: ENDOCRINOLOGY | Facility: CLINIC | Age: 59
End: 2024-04-10
Payer: COMMERCIAL

## 2024-04-10 VITALS
OXYGEN SATURATION: 95 % | HEART RATE: 86 BPM | BODY MASS INDEX: 29.35 KG/M2 | HEIGHT: 70 IN | WEIGHT: 205 LBS | SYSTOLIC BLOOD PRESSURE: 118 MMHG | DIASTOLIC BLOOD PRESSURE: 70 MMHG

## 2024-04-10 DIAGNOSIS — I10 ESSENTIAL (PRIMARY) HYPERTENSION: ICD-10-CM

## 2024-04-10 DIAGNOSIS — R79.89 OTHER SPECIFIED ABNORMAL FINDINGS OF BLOOD CHEMISTRY: ICD-10-CM

## 2024-04-10 DIAGNOSIS — R73.03 PREDIABETES.: ICD-10-CM

## 2024-04-10 DIAGNOSIS — E66.9 OBESITY, UNSPECIFIED: ICD-10-CM

## 2024-04-10 PROCEDURE — 36415 COLL VENOUS BLD VENIPUNCTURE: CPT

## 2024-04-10 PROCEDURE — 99214 OFFICE O/P EST MOD 30 MIN: CPT | Mod: 25

## 2024-04-10 NOTE — ASSESSMENT
[FreeTextEntry1] : 1. Hypogonadism - appears clinically stable on a smaller dose of testosterone - check levels this week - potential further downtitrating of Axiron was reviewed in details; but he wants to continue with 2 depressions daily - options for ED reviewed including PDE inhibitors. Patient will continue using Viagra PRN  2. HTN. - resume daily norvasc and f/u with cardiologist as scheduled - previously intolerant of Valsartan, Losartan in combo with HCTZ. - cont Benicar 20 mg qd - weight loss is crucial  4. Obesity - Current approaches to weight management are discussed with the patient. Suggested extensive nutritional education program. Proper dietary restrictions and exercise routines discussed. Medical weight loss therapies were reviewed with the patient. Avoid Qsymia, Contrave, Phentermine d/t uncontrolled HTN We again discussed GLP1RA. Wegovy is not available;  sucessful on Ozempic but it's denied.  resume Zepbound 2.5 mg qw once it's available and uptitrate (R+B)  4. Hyperlipidemia - dietary changes - continue Lipitor 10 mg HS  RTC 3-4 mos.

## 2024-04-10 NOTE — HISTORY OF PRESENT ILLNESS
[FreeTextEntry1] : 58 year  Male f/u for low testosterone levels.   *** Apr 10, 2024 ***  took Zepbound 2.5 mg x 2 months with some success (lost 5 lbs), but was not able to obtain it past few weeks taking Axiron 2 depressions daily, norvasc 10 mg qd, Benicar 20 mg qd, Lipitor 10 mg HS  CT angio (8/29/23)- Calcified and noncalcified plaque in the proximal LAD causes moderate to severe (between 60-70%) luminal narrowing. Coronary Calcium Score = 149 Agatston Units ( and LCx 4.   *** Jan 03, 2024 ***  used Ozempic sample- lost 10 lbs on it, but the medication was denied by insurance. regained weight since then wegovy is still not available struggles with weight despite strict dieting and being physically active taking Axiron 2 depressions daily, norvasc 10 mg qd, Benicar 20 mg qd  *** Oct 17, 2023 ***  concerned with inability to lose weight despite dieting, exercising BP is better on the new regimen wegovy has been approved, but unable to get it d/t shortage taking Axiron 2 depressions daily, norvasc 10 mg qd, Benicar 20 mg qd saw Dr. Oliveira- statins advised, but he's hesitant at present  *** Jun 06, 2023 ***  feels well, but gaining weight. exercising regularly, trying to diet w/o any success taking Axiron 2 depressions daily, norvasc 10 mg qd saw Dr. Oliveira last month- for ST  next month   *** Dec 07, 2022 ***  doing well, denies any c/o. Libido, erections are fine good energy level, very physically active. works a lot of hours taking Axiron 2 depressions daily, norvasc 10 mg qd (has not been taking for some time)   *** Jun 07, 2022 ***  feels great, denies any c/o. good erections, having normal spontaneous erections. libido is good feels very energetic and strong. physically active. no urinary issues taking Axiron 2 depressions daily, norvasc 10 mg qd  *** Dec 07, 2021 ***  feels great, no new c/o. good erections, having normal spontaneous erections. libido is good taking Axiron 2 depressions daily labs from 6/21- testo through 225  *** Jun 01, 2021 ***  taking Axiron 2 depressions daily. feels fine. lost some weight. no ED, libido is fine no recent labs  *** Dec 15, 2020 ***  saw Dr. Henao- echo done 12/1/20- normal EF, LV concentric remodeling taking Axiron 2 depressions daily. Feels great on it, good energy, erections ok. using viagra PRN Thyr US (12/8/20)- normal  *** Sep 21, 2020 ***  taking Axiron 2 depressions daily (dose reduced about 2 weeks), right after the blood work. Feels fine so far. Libido is fine. Erections are good, uses viagra very rare.  HPI: Mr Ayon was diagnosed with hypogonadism about 5 years ago in settings of evaluation for severe fatigue and low libido. His testosterone levels were low at that time. He was seen by Dr. Glass and started on testosterone therapy with Axiron. He felt much better on it, with a great improvement in his energy levels and sexual desire. His erections are fine on the current therapy, spontaneous am erections are well preserved. Shaving is well preserved. Libido is fine. He's been using Viagra PRN.  Denies visual disturbances or headaches, dizziness, nausea, vomiting, abdominal pain,  breast discharge, worsening in peripheral vision or excessive snoring. He was diagnosed with LILIANA and was using a CPAP at some point. However, he quit smoking about 4 years ago which helped a lot with his snoring and he is not using  CPA anymore.  He underwent puberty at a normal rate compared with his peers. No history of learning disabilities or behavioral disorders. No history of insults to the brain or testes, including surgery, trauma, tumors, or radiation exposure.  No history of diabetes, HIV, liver disease, or kidney disease. No history of medication use including anabolic steroids, glucocorticoids, chronic pain medications, or history of chemotherapy. His sense of smell is intact.  Family history is negative for infertility problems and low testosterone. Currently he is in monogamous relationship with his wife, fathered 3 children.

## 2024-04-10 NOTE — REASON FOR VISIT
[Follow - Up] : a follow-up visit [Weight Management/Obesity] : weight management/obesity [Hypogonadism] : hypogonadism Home

## 2024-04-11 LAB
ALBUMIN SERPL ELPH-MCNC: 4.4 G/DL
ALP BLD-CCNC: 77 U/L
ALT SERPL-CCNC: 22 U/L
ANION GAP SERPL CALC-SCNC: 16 MMOL/L
AST SERPL-CCNC: 17 U/L
BILIRUB SERPL-MCNC: 0.2 MG/DL
BUN SERPL-MCNC: 19 MG/DL
CALCIUM SERPL-MCNC: 9.3 MG/DL
CHLORIDE SERPL-SCNC: 103 MMOL/L
CHOLEST SERPL-MCNC: 161 MG/DL
CO2 SERPL-SCNC: 22 MMOL/L
CREAT SERPL-MCNC: 1.29 MG/DL
EGFR: 64 ML/MIN/1.73M2
ESTIMATED AVERAGE GLUCOSE: 117 MG/DL
GLUCOSE SERPL-MCNC: 120 MG/DL
HBA1C MFR BLD HPLC: 5.7 %
HDLC SERPL-MCNC: 38 MG/DL
LDLC SERPL CALC-MCNC: 87 MG/DL
NONHDLC SERPL-MCNC: 123 MG/DL
POTASSIUM SERPL-SCNC: 4.1 MMOL/L
PROT SERPL-MCNC: 7 G/DL
PSA SERPL-MCNC: 1.47 NG/ML
SODIUM SERPL-SCNC: 141 MMOL/L
T4 FREE SERPL-MCNC: 1.3 NG/DL
TRIGL SERPL-MCNC: 208 MG/DL
TSH SERPL-ACNC: 1.72 UIU/ML

## 2024-04-28 ENCOUNTER — RX RENEWAL (OUTPATIENT)
Age: 59
End: 2024-04-28

## 2024-04-28 RX ORDER — ATORVASTATIN CALCIUM 10 MG/1
10 TABLET, FILM COATED ORAL
Qty: 90 | Refills: 0 | Status: ACTIVE | COMMUNITY
Start: 2024-01-24 | End: 1900-01-01

## 2024-06-11 RX ORDER — AMLODIPINE BESYLATE 10 MG/1
10 TABLET ORAL
Qty: 30 | Refills: 0 | Status: ACTIVE | COMMUNITY
Start: 2022-04-04 | End: 1900-01-01

## 2024-06-11 RX ORDER — TESTOSTERONE 30 MG/1.5ML
30 SOLUTION TOPICAL
Qty: 1 | Refills: 0 | Status: ACTIVE | COMMUNITY
Start: 2018-05-07 | End: 1900-01-01

## 2024-06-16 ENCOUNTER — RX RENEWAL (OUTPATIENT)
Age: 59
End: 2024-06-16

## 2024-06-16 RX ORDER — MELOXICAM 15 MG/1
15 TABLET ORAL
Qty: 30 | Refills: 0 | Status: ACTIVE | COMMUNITY
Start: 2019-11-19 | End: 1900-01-01

## 2024-06-21 ENCOUNTER — APPOINTMENT (OUTPATIENT)
Dept: FAMILY MEDICINE | Facility: CLINIC | Age: 59
End: 2024-06-21
Payer: COMMERCIAL

## 2024-06-21 VITALS
HEIGHT: 70 IN | DIASTOLIC BLOOD PRESSURE: 84 MMHG | SYSTOLIC BLOOD PRESSURE: 132 MMHG | RESPIRATION RATE: 16 BRPM | BODY MASS INDEX: 29.2 KG/M2 | TEMPERATURE: 98.6 F | OXYGEN SATURATION: 95 % | HEART RATE: 83 BPM | WEIGHT: 204 LBS

## 2024-06-21 DIAGNOSIS — E78.49 OTHER HYPERLIPIDEMIA: ICD-10-CM

## 2024-06-21 PROCEDURE — 99213 OFFICE O/P EST LOW 20 MIN: CPT

## 2024-06-21 RX ORDER — SODIUM PICOSULFATE, MAGNESIUM OXIDE, AND ANHYDROUS CITRIC ACID 10; 3.5; 12 MG/160ML; G/160ML; G/160ML
10-3.5-12 MG-GM LIQUID ORAL
Qty: 1 | Refills: 0 | Status: DISCONTINUED | COMMUNITY
Start: 2023-03-21 | End: 2024-06-21

## 2024-06-21 NOTE — PLAN
[FreeTextEntry1] : goal ldl < 70  check lipids cmp will discuss possibly inc to every other day or three days a week with statin due to SE depending on lipids

## 2024-06-21 NOTE — PHYSICAL EXAM
[Normal] : affect was normal and insight and judgment were intact
Xray Chest 1 View-PORTABLE IMMEDIATE

## 2024-06-21 NOTE — HISTORY OF PRESENT ILLNESS
[FreeTextEntry1] : lipid check  [de-identified] : 58 yo M PMHx HTN, HLD, presenting for follow up and blood work taking statin once a week bc he notes he feels dizzy on it. lipids improved in april but not at goal  started zepbound by racheal

## 2024-06-24 LAB
ALBUMIN SERPL ELPH-MCNC: 4.4 G/DL
ALP BLD-CCNC: 70 U/L
ALT SERPL-CCNC: 16 U/L
ANION GAP SERPL CALC-SCNC: 14 MMOL/L
AST SERPL-CCNC: 16 U/L
BILIRUB SERPL-MCNC: 0.4 MG/DL
BUN SERPL-MCNC: 17 MG/DL
CALCIUM SERPL-MCNC: 9 MG/DL
CHLORIDE SERPL-SCNC: 105 MMOL/L
CHOLEST SERPL-MCNC: 188 MG/DL
CO2 SERPL-SCNC: 19 MMOL/L
CREAT SERPL-MCNC: 1.16 MG/DL
EGFR: 72 ML/MIN/1.73M2
GLUCOSE SERPL-MCNC: 138 MG/DL
HDLC SERPL-MCNC: 37 MG/DL
LDLC SERPL CALC-MCNC: 100 MG/DL
NONHDLC SERPL-MCNC: 151 MG/DL
POTASSIUM SERPL-SCNC: 4.2 MMOL/L
PROT SERPL-MCNC: 7 G/DL
SODIUM SERPL-SCNC: 139 MMOL/L
TRIGL SERPL-MCNC: 303 MG/DL

## 2024-07-12 ENCOUNTER — RX RENEWAL (OUTPATIENT)
Age: 59
End: 2024-07-12

## 2024-08-05 ENCOUNTER — RX RENEWAL (OUTPATIENT)
Age: 59
End: 2024-08-05

## 2024-08-08 ENCOUNTER — NON-APPOINTMENT (OUTPATIENT)
Age: 59
End: 2024-08-08

## 2024-08-08 ENCOUNTER — APPOINTMENT (OUTPATIENT)
Dept: ENDOCRINOLOGY | Facility: CLINIC | Age: 59
End: 2024-08-08

## 2024-08-08 PROCEDURE — 36415 COLL VENOUS BLD VENIPUNCTURE: CPT

## 2024-08-08 PROCEDURE — 99214 OFFICE O/P EST MOD 30 MIN: CPT | Mod: 25

## 2024-08-08 NOTE — ASSESSMENT
[FreeTextEntry1] : 1. Hypogonadism - appears clinically stable on a smaller dose of testosterone - check levels this week - potential further downtitrating of Axiron was reviewed in details; but he wants to continue with 2 depressions daily - options for ED reviewed including PDE inhibitors. Patient will continue using Viagra PRN  2. HTN. - resume daily norvasc and f/u with cardiologist as scheduled - previously intolerant of Valsartan, Losartan in combo with HCTZ. - cont Benicar 20 mg qd - weight loss is crucial  3. Obesity - Current approaches to weight management are discussed with the patient. Suggested extensive nutritional education program. Proper dietary restrictions and exercise routines discussed. Medical weight loss therapies were reviewed with the patient. Avoid Qsymia, Contrave, Phentermine d/t uncontrolled HTN We again discussed GLP1RA. Wegovy is not available;  sucessful on Ozempic but it's denied. increase Zepbound 5 mg qw and uptitrate (R+B)  4. Hyperlipidemia - dietary changes - continue Lipitor 10 mg HS  5. Paresthesia - trial of gabapentin 100 mg 1-2 cap at HS, and f/u with PCP. R+B reviewed  RTC 3-4 mos.

## 2024-08-08 NOTE — HISTORY OF PRESENT ILLNESS
[FreeTextEntry1] : 59 year  Male f/u for low testosterone levels.   *** Aug 08, 2024 ***  doing well overall; some worsening of the night time feet tingling good energy, no ED, libido improved staying on Zepbound 2.5 mg qw,Axiron 2 depressions daily , norvasc 10 mg qd, Benicar 20 mg qd, Lipitor 10 mg HS   *** Apr 10, 2024 ***  took Zepbound 2.5 mg x 2 months with some success (lost 5 lbs), but was not able to obtain it past few weeks taking Axiron 2 depressions daily, norvasc 10 mg qd, Benicar 20 mg qd, Lipitor 10 mg HS  CT angio (8/29/23)- Calcified and noncalcified plaque in the proximal LAD causes moderate to severe (between 60-70%) luminal narrowing. Coronary Calcium Score = 149 Agatston Units ( and LCx 4.   *** Jan 03, 2024 ***  used Ozempic sample- lost 10 lbs on it, but the medication was denied by insurance. regained weight since then wegovy is still not available struggles with weight despite strict dieting and being physically active taking Axiron 2 depressions daily, norvasc 10 mg qd, Benicar 20 mg qd  *** Oct 17, 2023 ***  concerned with inability to lose weight despite dieting, exercising BP is better on the new regimen wegovy has been approved, but unable to get it d/t shortage taking Axiron 2 depressions daily, norvasc 10 mg qd, Benicar 20 mg qd saw Dr. Oliveira- statins advised, but he's hesitant at present  *** Jun 06, 2023 ***  feels well, but gaining weight. exercising regularly, trying to diet w/o any success taking Axiron 2 depressions daily, norvasc 10 mg qd saw Dr. Oliveira last month- for ST  next month   *** Dec 07, 2022 ***  doing well, denies any c/o. Libido, erections are fine good energy level, very physically active. works a lot of hours taking Axiron 2 depressions daily, norvasc 10 mg qd (has not been taking for some time)   *** Jun 07, 2022 ***  feels great, denies any c/o. good erections, having normal spontaneous erections. libido is good feels very energetic and strong. physically active. no urinary issues taking Axiron 2 depressions daily, norvasc 10 mg qd  *** Dec 07, 2021 ***  feels great, no new c/o. good erections, having normal spontaneous erections. libido is good taking Axiron 2 depressions daily labs from 6/21- testo through 225  *** Jun 01, 2021 ***  taking Axiron 2 depressions daily. feels fine. lost some weight. no ED, libido is fine no recent labs  *** Dec 15, 2020 ***  saw Dr. Henao- echo done 12/1/20- normal EF, LV concentric remodeling taking Axiron 2 depressions daily. Feels great on it, good energy, erections ok. using viagra PRN Thyr US (12/8/20)- normal  *** Sep 21, 2020 ***  taking Axiron 2 depressions daily (dose reduced about 2 weeks), right after the blood work. Feels fine so far. Libido is fine. Erections are good, uses viagra very rare.  HPI: Mr Ayon was diagnosed with hypogonadism about 5 years ago in settings of evaluation for severe fatigue and low libido. His testosterone levels were low at that time. He was seen by Dr. Glass and started on testosterone therapy with Axiron. He felt much better on it, with a great improvement in his energy levels and sexual desire. His erections are fine on the current therapy, spontaneous am erections are well preserved. Shaving is well preserved. Libido is fine. He's been using Viagra PRN.  Denies visual disturbances or headaches, dizziness, nausea, vomiting, abdominal pain,  breast discharge, worsening in peripheral vision or excessive snoring. He was diagnosed with LILIANA and was using a CPAP at some point. However, he quit smoking about 4 years ago which helped a lot with his snoring and he is not using  CPA anymore.  He underwent puberty at a normal rate compared with his peers. No history of learning disabilities or behavioral disorders. No history of insults to the brain or testes, including surgery, trauma, tumors, or radiation exposure.  No history of diabetes, HIV, liver disease, or kidney disease. No history of medication use including anabolic steroids, glucocorticoids, chronic pain medications, or history of chemotherapy. His sense of smell is intact.  Family history is negative for infertility problems and low testosterone. Currently he is in monogamous relationship with his wife, fathered 3 children.

## 2024-08-22 ENCOUNTER — RX RENEWAL (OUTPATIENT)
Age: 59
End: 2024-08-22

## 2024-09-03 ENCOUNTER — RX RENEWAL (OUTPATIENT)
Age: 59
End: 2024-09-03

## 2024-09-27 ENCOUNTER — RX RENEWAL (OUTPATIENT)
Age: 59
End: 2024-09-27

## 2024-10-01 ENCOUNTER — RX RENEWAL (OUTPATIENT)
Age: 59
End: 2024-10-01

## 2024-10-28 ENCOUNTER — RX RENEWAL (OUTPATIENT)
Age: 59
End: 2024-10-28

## 2024-11-20 ENCOUNTER — APPOINTMENT (OUTPATIENT)
Dept: ENDOCRINOLOGY | Facility: CLINIC | Age: 59
End: 2024-11-20
Payer: COMMERCIAL

## 2024-11-20 VITALS
BODY MASS INDEX: 29.63 KG/M2 | RESPIRATION RATE: 16 BRPM | OXYGEN SATURATION: 96 % | DIASTOLIC BLOOD PRESSURE: 76 MMHG | HEART RATE: 86 BPM | WEIGHT: 207 LBS | HEIGHT: 70 IN | SYSTOLIC BLOOD PRESSURE: 118 MMHG | TEMPERATURE: 97.7 F

## 2024-11-20 DIAGNOSIS — I10 ESSENTIAL (PRIMARY) HYPERTENSION: ICD-10-CM

## 2024-11-20 DIAGNOSIS — E66.9 OBESITY, UNSPECIFIED: ICD-10-CM

## 2024-11-20 DIAGNOSIS — R73.03 PREDIABETES.: ICD-10-CM

## 2024-11-20 DIAGNOSIS — R79.89 OTHER SPECIFIED ABNORMAL FINDINGS OF BLOOD CHEMISTRY: ICD-10-CM

## 2024-11-20 DIAGNOSIS — E78.49 OTHER HYPERLIPIDEMIA: ICD-10-CM

## 2024-11-20 PROCEDURE — 99214 OFFICE O/P EST MOD 30 MIN: CPT

## 2024-11-20 PROCEDURE — 36415 COLL VENOUS BLD VENIPUNCTURE: CPT

## 2024-11-21 ENCOUNTER — TRANSCRIPTION ENCOUNTER (OUTPATIENT)
Age: 59
End: 2024-11-21

## 2024-11-21 LAB
ALBUMIN SERPL ELPH-MCNC: 4.6 G/DL
ALP BLD-CCNC: 72 U/L
ALT SERPL-CCNC: 18 U/L
ANION GAP SERPL CALC-SCNC: 14 MMOL/L
AST SERPL-CCNC: 17 U/L
BASOPHILS # BLD AUTO: 0.07 K/UL
BASOPHILS NFR BLD AUTO: 0.9 %
BILIRUB SERPL-MCNC: 0.6 MG/DL
BUN SERPL-MCNC: 21 MG/DL
CALCIUM SERPL-MCNC: 9.7 MG/DL
CHLORIDE SERPL-SCNC: 101 MMOL/L
CHOLEST SERPL-MCNC: 213 MG/DL
CO2 SERPL-SCNC: 26 MMOL/L
CREAT SERPL-MCNC: 1.33 MG/DL
EGFR: 61 ML/MIN/1.73M2
EOSINOPHIL # BLD AUTO: 0.16 K/UL
EOSINOPHIL NFR BLD AUTO: 2.1 %
ESTIMATED AVERAGE GLUCOSE: 105 MG/DL
GLUCOSE SERPL-MCNC: 78 MG/DL
HBA1C MFR BLD HPLC: 5.3 %
HCT VFR BLD CALC: 51.8 %
HDLC SERPL-MCNC: 43 MG/DL
HGB BLD-MCNC: 17 G/DL
IMM GRANULOCYTES NFR BLD AUTO: 0.5 %
LDLC SERPL CALC-MCNC: 135 MG/DL
LYMPHOCYTES # BLD AUTO: 1.74 K/UL
LYMPHOCYTES NFR BLD AUTO: 23.3 %
MAN DIFF?: NORMAL
MCHC RBC-ENTMCNC: 31.2 PG
MCHC RBC-ENTMCNC: 32.8 G/DL
MCV RBC AUTO: 95 FL
MONOCYTES # BLD AUTO: 0.77 K/UL
MONOCYTES NFR BLD AUTO: 10.3 %
NEUTROPHILS # BLD AUTO: 4.7 K/UL
NEUTROPHILS NFR BLD AUTO: 62.9 %
NONHDLC SERPL-MCNC: 170 MG/DL
PLATELET # BLD AUTO: 213 K/UL
POTASSIUM SERPL-SCNC: 5 MMOL/L
PROT SERPL-MCNC: 7.5 G/DL
PSA SERPL-MCNC: 1.39 NG/ML
RBC # BLD: 5.45 M/UL
RBC # FLD: 13.8 %
SHBG SERPL-SCNC: 16 NMOL/L
SODIUM SERPL-SCNC: 141 MMOL/L
T4 FREE SERPL-MCNC: 1.4 NG/DL
TESTOST FREE SERPL-MCNC: 6 PG/ML
TESTOST SERPL-MCNC: 182 NG/DL
TRIGL SERPL-MCNC: 194 MG/DL
TSH SERPL-ACNC: 1.91 UIU/ML
WBC # FLD AUTO: 7.48 K/UL

## 2024-11-22 ENCOUNTER — RX RENEWAL (OUTPATIENT)
Age: 59
End: 2024-11-22

## 2024-11-29 ENCOUNTER — RX RENEWAL (OUTPATIENT)
Age: 59
End: 2024-11-29

## 2024-12-18 ENCOUNTER — RX RENEWAL (OUTPATIENT)
Age: 59
End: 2024-12-18

## 2025-01-21 ENCOUNTER — RX RENEWAL (OUTPATIENT)
Age: 60
End: 2025-01-21

## 2025-02-10 ENCOUNTER — APPOINTMENT (OUTPATIENT)
Dept: ENDOCRINOLOGY | Facility: CLINIC | Age: 60
End: 2025-02-10
Payer: COMMERCIAL

## 2025-02-10 VITALS
TEMPERATURE: 98.3 F | OXYGEN SATURATION: 96 % | HEART RATE: 79 BPM | BODY MASS INDEX: 29.27 KG/M2 | SYSTOLIC BLOOD PRESSURE: 152 MMHG | WEIGHT: 204 LBS | RESPIRATION RATE: 16 BRPM | DIASTOLIC BLOOD PRESSURE: 89 MMHG

## 2025-02-10 DIAGNOSIS — R73.03 PREDIABETES.: ICD-10-CM

## 2025-02-10 DIAGNOSIS — I10 ESSENTIAL (PRIMARY) HYPERTENSION: ICD-10-CM

## 2025-02-10 DIAGNOSIS — E78.49 OTHER HYPERLIPIDEMIA: ICD-10-CM

## 2025-02-10 DIAGNOSIS — R79.89 OTHER SPECIFIED ABNORMAL FINDINGS OF BLOOD CHEMISTRY: ICD-10-CM

## 2025-02-10 DIAGNOSIS — E66.9 OBESITY, UNSPECIFIED: ICD-10-CM

## 2025-02-10 PROCEDURE — 99214 OFFICE O/P EST MOD 30 MIN: CPT

## 2025-02-10 PROCEDURE — 36415 COLL VENOUS BLD VENIPUNCTURE: CPT

## 2025-02-12 LAB
25(OH)D3 SERPL-MCNC: 28.7 NG/ML
ALBUMIN SERPL ELPH-MCNC: 4.4 G/DL
ALP BLD-CCNC: 81 U/L
ALT SERPL-CCNC: 20 U/L
ANION GAP SERPL CALC-SCNC: 16 MMOL/L
AST SERPL-CCNC: 18 U/L
BASOPHILS # BLD AUTO: 0.09 K/UL
BASOPHILS NFR BLD AUTO: 0.9 %
BILIRUB SERPL-MCNC: 0.3 MG/DL
BUN SERPL-MCNC: 19 MG/DL
CALCIUM SERPL-MCNC: 9.3 MG/DL
CHLORIDE SERPL-SCNC: 104 MMOL/L
CHOLEST SERPL-MCNC: 187 MG/DL
CO2 SERPL-SCNC: 22 MMOL/L
CREAT SERPL-MCNC: 1.13 MG/DL
EGFR: 74 ML/MIN/1.73M2
EOSINOPHIL # BLD AUTO: 0.24 K/UL
EOSINOPHIL NFR BLD AUTO: 2.4 %
ESTIMATED AVERAGE GLUCOSE: 117 MG/DL
GLUCOSE SERPL-MCNC: 76 MG/DL
HBA1C MFR BLD HPLC: 5.7 %
HCT VFR BLD CALC: 52.8 %
HDLC SERPL-MCNC: 45 MG/DL
HGB BLD-MCNC: 17.3 G/DL
IMM GRANULOCYTES NFR BLD AUTO: 0.3 %
LDLC SERPL CALC-MCNC: 117 MG/DL
LYMPHOCYTES # BLD AUTO: 2.14 K/UL
LYMPHOCYTES NFR BLD AUTO: 21.6 %
MAN DIFF?: NORMAL
MCHC RBC-ENTMCNC: 30.6 PG
MCHC RBC-ENTMCNC: 32.8 G/DL
MCV RBC AUTO: 93.3 FL
MONOCYTES # BLD AUTO: 0.89 K/UL
MONOCYTES NFR BLD AUTO: 9 %
NEUTROPHILS # BLD AUTO: 6.52 K/UL
NEUTROPHILS NFR BLD AUTO: 65.8 %
NONHDLC SERPL-MCNC: 142 MG/DL
PLATELET # BLD AUTO: 241 K/UL
POTASSIUM SERPL-SCNC: 4 MMOL/L
PROT SERPL-MCNC: 7.3 G/DL
PSA SERPL-MCNC: 1.09 NG/ML
RBC # BLD: 5.66 M/UL
RBC # FLD: 13.3 %
SHBG SERPL-SCNC: 20.6 NMOL/L
SODIUM SERPL-SCNC: 142 MMOL/L
T4 FREE SERPL-MCNC: 1.4 NG/DL
TESTOST FREE SERPL-MCNC: 7.8 PG/ML
TESTOST SERPL-MCNC: 316 NG/DL
TRIGL SERPL-MCNC: 138 MG/DL
TSH SERPL-ACNC: 2.27 UIU/ML
WBC # FLD AUTO: 9.91 K/UL

## 2025-02-19 ENCOUNTER — RX RENEWAL (OUTPATIENT)
Age: 60
End: 2025-02-19

## 2025-02-20 ENCOUNTER — NON-APPOINTMENT (OUTPATIENT)
Age: 60
End: 2025-02-20

## 2025-02-21 ENCOUNTER — TRANSCRIPTION ENCOUNTER (OUTPATIENT)
Age: 60
End: 2025-02-21

## 2025-03-10 ENCOUNTER — TRANSCRIPTION ENCOUNTER (OUTPATIENT)
Age: 60
End: 2025-03-10

## 2025-03-18 ENCOUNTER — APPOINTMENT (OUTPATIENT)
Dept: ORTHOPEDIC SURGERY | Facility: CLINIC | Age: 60
End: 2025-03-18
Payer: COMMERCIAL

## 2025-03-18 VITALS — BODY MASS INDEX: 28.92 KG/M2 | HEIGHT: 70 IN | WEIGHT: 202 LBS

## 2025-03-18 DIAGNOSIS — M54.16 RADICULOPATHY, LUMBAR REGION: ICD-10-CM

## 2025-03-18 PROCEDURE — 72100 X-RAY EXAM L-S SPINE 2/3 VWS: CPT

## 2025-03-18 PROCEDURE — 73564 X-RAY EXAM KNEE 4 OR MORE: CPT | Mod: RT

## 2025-03-18 PROCEDURE — 99204 OFFICE O/P NEW MOD 45 MIN: CPT

## 2025-03-18 PROCEDURE — 72170 X-RAY EXAM OF PELVIS: CPT

## 2025-03-18 RX ORDER — METHYLPREDNISOLONE 4 MG/1
4 TABLET ORAL
Qty: 1 | Refills: 0 | Status: ACTIVE | COMMUNITY
Start: 2025-03-18 | End: 1900-01-01

## 2025-03-25 ENCOUNTER — TRANSCRIPTION ENCOUNTER (OUTPATIENT)
Age: 60
End: 2025-03-25

## 2025-04-23 ENCOUNTER — TRANSCRIPTION ENCOUNTER (OUTPATIENT)
Age: 60
End: 2025-04-23

## 2025-05-21 ENCOUNTER — APPOINTMENT (OUTPATIENT)
Dept: ENDOCRINOLOGY | Facility: CLINIC | Age: 60
End: 2025-05-21
Payer: COMMERCIAL

## 2025-05-21 VITALS
WEIGHT: 190 LBS | BODY MASS INDEX: 27.2 KG/M2 | HEART RATE: 81 BPM | SYSTOLIC BLOOD PRESSURE: 108 MMHG | RESPIRATION RATE: 16 BRPM | OXYGEN SATURATION: 95 % | DIASTOLIC BLOOD PRESSURE: 73 MMHG | HEIGHT: 70 IN

## 2025-05-21 DIAGNOSIS — R73.03 PREDIABETES.: ICD-10-CM

## 2025-05-21 DIAGNOSIS — E78.49 OTHER HYPERLIPIDEMIA: ICD-10-CM

## 2025-05-21 DIAGNOSIS — E66.9 OBESITY, UNSPECIFIED: ICD-10-CM

## 2025-05-21 DIAGNOSIS — I10 ESSENTIAL (PRIMARY) HYPERTENSION: ICD-10-CM

## 2025-05-21 DIAGNOSIS — R79.89 OTHER SPECIFIED ABNORMAL FINDINGS OF BLOOD CHEMISTRY: ICD-10-CM

## 2025-05-21 PROCEDURE — G2211 COMPLEX E/M VISIT ADD ON: CPT | Mod: NC

## 2025-05-21 PROCEDURE — 36415 COLL VENOUS BLD VENIPUNCTURE: CPT

## 2025-05-21 PROCEDURE — 99214 OFFICE O/P EST MOD 30 MIN: CPT

## 2025-05-23 ENCOUNTER — TRANSCRIPTION ENCOUNTER (OUTPATIENT)
Age: 60
End: 2025-05-23

## 2025-05-23 LAB
25(OH)D3 SERPL-MCNC: 35.5 NG/ML
ALBUMIN SERPL ELPH-MCNC: 4.5 G/DL
ALP BLD-CCNC: 68 U/L
ALT SERPL-CCNC: 19 U/L
ANION GAP SERPL CALC-SCNC: 14 MMOL/L
AST SERPL-CCNC: 17 U/L
BASOPHILS # BLD AUTO: 0.05 K/UL
BASOPHILS NFR BLD AUTO: 0.7 %
BILIRUB SERPL-MCNC: 0.5 MG/DL
BUN SERPL-MCNC: 29 MG/DL
CALCIUM SERPL-MCNC: 10 MG/DL
CHLORIDE SERPL-SCNC: 107 MMOL/L
CHOLEST SERPL-MCNC: 149 MG/DL
CO2 SERPL-SCNC: 20 MMOL/L
CREAT SERPL-MCNC: 1.37 MG/DL
EGFRCR SERPLBLD CKD-EPI 2021: 59 ML/MIN/1.73M2
EOSINOPHIL # BLD AUTO: 0.17 K/UL
EOSINOPHIL NFR BLD AUTO: 2.5 %
ESTIMATED AVERAGE GLUCOSE: 111 MG/DL
FOLATE SERPL-MCNC: 9.7 NG/ML
GLUCOSE SERPL-MCNC: 85 MG/DL
HBA1C MFR BLD HPLC: 5.5 %
HCT VFR BLD CALC: 49.2 %
HDLC SERPL-MCNC: 42 MG/DL
HGB BLD-MCNC: 16.7 G/DL
IMM GRANULOCYTES NFR BLD AUTO: 0.3 %
LDLC SERPL-MCNC: 89 MG/DL
LYMPHOCYTES # BLD AUTO: 1.6 K/UL
LYMPHOCYTES NFR BLD AUTO: 23.4 %
MAN DIFF?: NORMAL
MCHC RBC-ENTMCNC: 31.4 PG
MCHC RBC-ENTMCNC: 33.9 G/DL
MCV RBC AUTO: 92.5 FL
MONOCYTES # BLD AUTO: 0.7 K/UL
MONOCYTES NFR BLD AUTO: 10.2 %
NEUTROPHILS # BLD AUTO: 4.31 K/UL
NEUTROPHILS NFR BLD AUTO: 62.9 %
NONHDLC SERPL-MCNC: 107 MG/DL
PLATELET # BLD AUTO: 246 K/UL
POTASSIUM SERPL-SCNC: 4.7 MMOL/L
PROT SERPL-MCNC: 7.4 G/DL
PSA SERPL-MCNC: 1.96 NG/ML
RBC # BLD: 5.32 M/UL
RBC # FLD: 13.2 %
SHBG SERPL-SCNC: 19.2 NMOL/L
SODIUM SERPL-SCNC: 141 MMOL/L
T4 FREE SERPL-MCNC: 1.5 NG/DL
TRIGL SERPL-MCNC: 97 MG/DL
TSH SERPL-ACNC: 1.44 UIU/ML
VIT B12 SERPL-MCNC: 457 PG/ML
WBC # FLD AUTO: 6.85 K/UL

## 2025-05-27 LAB
TESTOST FREE SERPL-MCNC: 4.4 PG/ML
TESTOST SERPL-MCNC: 191 NG/DL

## 2025-07-07 ENCOUNTER — APPOINTMENT (OUTPATIENT)
Dept: FAMILY MEDICINE | Facility: CLINIC | Age: 60
End: 2025-07-07
Payer: COMMERCIAL

## 2025-07-07 VITALS
RESPIRATION RATE: 16 BRPM | TEMPERATURE: 98.2 F | BODY MASS INDEX: 27.35 KG/M2 | SYSTOLIC BLOOD PRESSURE: 114 MMHG | WEIGHT: 191 LBS | HEIGHT: 70 IN | HEART RATE: 87 BPM | DIASTOLIC BLOOD PRESSURE: 80 MMHG | OXYGEN SATURATION: 95 %

## 2025-07-07 PROBLEM — R35.1 NOCTURIA: Status: ACTIVE | Noted: 2025-07-07

## 2025-07-07 PROCEDURE — 99396 PREV VISIT EST AGE 40-64: CPT

## 2025-07-07 PROCEDURE — 36415 COLL VENOUS BLD VENIPUNCTURE: CPT

## 2025-07-08 ENCOUNTER — APPOINTMENT (OUTPATIENT)
Dept: ORTHOPEDIC SURGERY | Facility: CLINIC | Age: 60
End: 2025-07-08

## 2025-07-08 PROBLEM — M79.18 MYOFASCIAL PAIN SYNDROME OF LUMBAR SPINE: Status: ACTIVE | Noted: 2025-07-08

## 2025-07-08 PROBLEM — M47.816 FACET ARTHRITIS OF LUMBAR REGION: Status: ACTIVE | Noted: 2025-07-08

## 2025-07-08 PROBLEM — M51.362 DEGENERATION OF INTERVERTEBRAL DISC OF LUMBAR REGION WITH DISCOGENIC BACK PAIN AND LOWER EXTREMITY PAIN: Status: ACTIVE | Noted: 2025-07-08

## 2025-07-08 LAB
ALBUMIN SERPL ELPH-MCNC: 4.3 G/DL
ALP BLD-CCNC: 67 U/L
ALT SERPL-CCNC: 17 U/L
ANION GAP SERPL CALC-SCNC: 15 MMOL/L
APPEARANCE: CLEAR
AST SERPL-CCNC: 18 U/L
BASOPHILS # BLD AUTO: 0.06 K/UL
BASOPHILS NFR BLD AUTO: 0.9 %
BILIRUB SERPL-MCNC: 0.5 MG/DL
BILIRUBIN URINE: NEGATIVE
BLOOD URINE: NEGATIVE
BUN SERPL-MCNC: 24 MG/DL
CALCIUM SERPL-MCNC: 9.7 MG/DL
CHLORIDE SERPL-SCNC: 110 MMOL/L
CHOLEST SERPL-MCNC: 187 MG/DL
CO2 SERPL-SCNC: 17 MMOL/L
COLOR: YELLOW
CREAT SERPL-MCNC: 1.17 MG/DL
EGFRCR SERPLBLD CKD-EPI 2021: 71 ML/MIN/1.73M2
EOSINOPHIL # BLD AUTO: 0.13 K/UL
EOSINOPHIL NFR BLD AUTO: 2 %
ESTIMATED AVERAGE GLUCOSE: 105 MG/DL
GLUCOSE QUALITATIVE U: NEGATIVE MG/DL
GLUCOSE SERPL-MCNC: 100 MG/DL
HBA1C MFR BLD HPLC: 5.3 %
HCT VFR BLD CALC: 47.2 %
HDLC SERPL-MCNC: 43 MG/DL
HGB BLD-MCNC: 16 G/DL
IMM GRANULOCYTES NFR BLD AUTO: 0.3 %
KETONES URINE: NEGATIVE MG/DL
LDLC SERPL-MCNC: 114 MG/DL
LEUKOCYTE ESTERASE URINE: NEGATIVE
LYMPHOCYTES # BLD AUTO: 1.51 K/UL
LYMPHOCYTES NFR BLD AUTO: 22.7 %
MAN DIFF?: NORMAL
MCHC RBC-ENTMCNC: 31.6 PG
MCHC RBC-ENTMCNC: 33.9 G/DL
MCV RBC AUTO: 93.1 FL
MONOCYTES # BLD AUTO: 0.7 K/UL
MONOCYTES NFR BLD AUTO: 10.5 %
NEUTROPHILS # BLD AUTO: 4.24 K/UL
NEUTROPHILS NFR BLD AUTO: 63.6 %
NITRITE URINE: NEGATIVE
NONHDLC SERPL-MCNC: 144 MG/DL
PH URINE: 5.5
PLATELET # BLD AUTO: 214 K/UL
POTASSIUM SERPL-SCNC: 4.4 MMOL/L
PROT SERPL-MCNC: 7 G/DL
PROTEIN URINE: NORMAL MG/DL
RBC # BLD: 5.07 M/UL
RBC # FLD: 13.8 %
SODIUM SERPL-SCNC: 143 MMOL/L
SPECIFIC GRAVITY URINE: 1.03
TRIGL SERPL-MCNC: 170 MG/DL
TSH SERPL-ACNC: 1.7 UIU/ML
UROBILINOGEN URINE: 0.2 MG/DL
WBC # FLD AUTO: 6.66 K/UL

## 2025-07-08 PROCEDURE — 99214 OFFICE O/P EST MOD 30 MIN: CPT

## 2025-07-08 RX ORDER — MELOXICAM 15 MG/1
15 TABLET ORAL
Qty: 30 | Refills: 0 | Status: ACTIVE | COMMUNITY
Start: 2025-07-08 | End: 1900-01-01

## 2025-07-30 ENCOUNTER — NON-APPOINTMENT (OUTPATIENT)
Age: 60
End: 2025-07-30

## 2025-08-01 ENCOUNTER — TRANSCRIPTION ENCOUNTER (OUTPATIENT)
Age: 60
End: 2025-08-01

## 2025-08-01 ENCOUNTER — NON-APPOINTMENT (OUTPATIENT)
Age: 60
End: 2025-08-01

## 2025-08-05 ENCOUNTER — TRANSCRIPTION ENCOUNTER (OUTPATIENT)
Age: 60
End: 2025-08-05

## 2025-08-12 ENCOUNTER — APPOINTMENT (OUTPATIENT)
Dept: ENDOCRINOLOGY | Facility: CLINIC | Age: 60
End: 2025-08-12
Payer: COMMERCIAL

## 2025-08-12 VITALS
WEIGHT: 193 LBS | DIASTOLIC BLOOD PRESSURE: 74 MMHG | HEART RATE: 82 BPM | OXYGEN SATURATION: 96 % | BODY MASS INDEX: 27.63 KG/M2 | HEIGHT: 70 IN | RESPIRATION RATE: 16 BRPM | SYSTOLIC BLOOD PRESSURE: 114 MMHG

## 2025-08-12 DIAGNOSIS — R79.89 OTHER SPECIFIED ABNORMAL FINDINGS OF BLOOD CHEMISTRY: ICD-10-CM

## 2025-08-12 DIAGNOSIS — E78.49 OTHER HYPERLIPIDEMIA: ICD-10-CM

## 2025-08-12 DIAGNOSIS — R73.03 PREDIABETES.: ICD-10-CM

## 2025-08-12 DIAGNOSIS — E66.9 OBESITY, UNSPECIFIED: ICD-10-CM

## 2025-08-12 PROCEDURE — 99214 OFFICE O/P EST MOD 30 MIN: CPT

## 2025-08-12 PROCEDURE — G2211 COMPLEX E/M VISIT ADD ON: CPT | Mod: NC

## 2025-08-19 ENCOUNTER — APPOINTMENT (OUTPATIENT)
Dept: ORTHOPEDIC SURGERY | Facility: CLINIC | Age: 60
End: 2025-08-19

## 2025-08-19 VITALS — BODY MASS INDEX: 27.63 KG/M2 | WEIGHT: 193 LBS | HEIGHT: 70 IN

## 2025-08-19 DIAGNOSIS — M54.16 RADICULOPATHY, LUMBAR REGION: ICD-10-CM

## 2025-08-19 RX ORDER — MELOXICAM 15 MG/1
15 TABLET ORAL
Qty: 30 | Refills: 0 | Status: ACTIVE | COMMUNITY
Start: 2025-08-19 | End: 1900-01-01

## 2025-09-03 ENCOUNTER — TRANSCRIPTION ENCOUNTER (OUTPATIENT)
Age: 60
End: 2025-09-03

## 2025-09-08 ENCOUNTER — TRANSCRIPTION ENCOUNTER (OUTPATIENT)
Age: 60
End: 2025-09-08

## 2025-09-09 ENCOUNTER — TRANSCRIPTION ENCOUNTER (OUTPATIENT)
Age: 60
End: 2025-09-09

## 2025-09-10 ENCOUNTER — TRANSCRIPTION ENCOUNTER (OUTPATIENT)
Age: 60
End: 2025-09-10

## 2025-09-15 ENCOUNTER — TRANSCRIPTION ENCOUNTER (OUTPATIENT)
Age: 60
End: 2025-09-15